# Patient Record
Sex: FEMALE | Race: WHITE | Employment: OTHER | URBAN - METROPOLITAN AREA
[De-identification: names, ages, dates, MRNs, and addresses within clinical notes are randomized per-mention and may not be internally consistent; named-entity substitution may affect disease eponyms.]

---

## 2020-08-24 ENCOUNTER — VIRTUAL VISIT (OUTPATIENT)
Dept: FAMILY MEDICINE CLINIC | Age: 80
End: 2020-08-24

## 2020-08-24 DIAGNOSIS — Z91.199 NO-SHOW FOR APPOINTMENT: Primary | ICD-10-CM

## 2021-05-06 ENCOUNTER — VIRTUAL VISIT (OUTPATIENT)
Dept: FAMILY MEDICINE CLINIC | Age: 81
End: 2021-05-06
Payer: MEDICARE

## 2021-05-06 DIAGNOSIS — D50.9 IRON DEFICIENCY ANEMIA, UNSPECIFIED IRON DEFICIENCY ANEMIA TYPE: ICD-10-CM

## 2021-05-06 DIAGNOSIS — M54.50 ACUTE LEFT-SIDED LOW BACK PAIN WITHOUT SCIATICA: ICD-10-CM

## 2021-05-06 DIAGNOSIS — E04.1 THYROID NODULE: ICD-10-CM

## 2021-05-06 DIAGNOSIS — R29.6 FALLS FREQUENTLY: ICD-10-CM

## 2021-05-06 DIAGNOSIS — F41.8 MIXED ANXIETY AND DEPRESSIVE DISORDER: ICD-10-CM

## 2021-05-06 DIAGNOSIS — F03.90 DEMENTIA WITHOUT BEHAVIORAL DISTURBANCE, UNSPECIFIED DEMENTIA TYPE: ICD-10-CM

## 2021-05-06 DIAGNOSIS — R27.0 ATAXIA: ICD-10-CM

## 2021-05-06 DIAGNOSIS — E11.9 DIABETES MELLITUS TYPE 2, DIET-CONTROLLED (HCC): Primary | ICD-10-CM

## 2021-05-06 DIAGNOSIS — R42 DIZZINESS: ICD-10-CM

## 2021-05-06 DIAGNOSIS — E21.1 SECONDARY HYPERPARATHYROIDISM, NON-RENAL (HCC): ICD-10-CM

## 2021-05-06 DIAGNOSIS — I10 ESSENTIAL HYPERTENSION: ICD-10-CM

## 2021-05-06 DIAGNOSIS — E78.2 MIXED HYPERLIPIDEMIA: ICD-10-CM

## 2021-05-06 DIAGNOSIS — Z13.31 POSITIVE DEPRESSION SCREENING: ICD-10-CM

## 2021-05-06 DIAGNOSIS — E87.6 HYPOKALEMIA: ICD-10-CM

## 2021-05-06 DIAGNOSIS — E55.9 VITAMIN D DEFICIENCY: ICD-10-CM

## 2021-05-06 PROBLEM — F41.9 ANXIETY: Status: ACTIVE | Noted: 2018-12-07

## 2021-05-06 PROBLEM — F43.21 GRIEF AT LOSS OF CHILD: Status: ACTIVE | Noted: 2018-12-07

## 2021-05-06 PROBLEM — R09.89 LABILE BLOOD PRESSURE: Status: ACTIVE | Noted: 2020-03-16

## 2021-05-06 PROBLEM — I45.10 RIGHT BUNDLE BRANCH BLOCK: Status: ACTIVE | Noted: 2017-10-11

## 2021-05-06 PROBLEM — Z63.4 GRIEF AT LOSS OF CHILD: Status: ACTIVE | Noted: 2018-12-07

## 2021-05-06 PROBLEM — C43.9 MALIGNANT MELANOMA OF SKIN (HCC): Status: ACTIVE | Noted: 2021-05-06

## 2021-05-06 PROBLEM — R01.1 MURMUR: Status: ACTIVE | Noted: 2020-03-16

## 2021-05-06 PROBLEM — M85.80 OSTEOPENIA: Status: ACTIVE | Noted: 2020-03-16

## 2021-05-06 PROBLEM — K57.30 DIVERTICULOSIS OF COLON: Status: ACTIVE | Noted: 2020-03-16

## 2021-05-06 PROBLEM — I69.30 SEQUELA OF CEREBROVASCULAR ACCIDENT: Status: ACTIVE | Noted: 2020-03-16

## 2021-05-06 PROCEDURE — 1090F PRES/ABSN URINE INCON ASSESS: CPT | Performed by: NURSE PRACTITIONER

## 2021-05-06 PROCEDURE — G8756 NO BP MEASURE DOC: HCPCS | Performed by: NURSE PRACTITIONER

## 2021-05-06 PROCEDURE — 1101F PT FALLS ASSESS-DOCD LE1/YR: CPT | Performed by: NURSE PRACTITIONER

## 2021-05-06 PROCEDURE — G9717 DOC PT DX DEP/BP F/U NT REQ: HCPCS | Performed by: NURSE PRACTITIONER

## 2021-05-06 PROCEDURE — G8427 DOCREV CUR MEDS BY ELIG CLIN: HCPCS | Performed by: NURSE PRACTITIONER

## 2021-05-06 PROCEDURE — G8400 PT W/DXA NO RESULTS DOC: HCPCS | Performed by: NURSE PRACTITIONER

## 2021-05-06 PROCEDURE — 99204 OFFICE O/P NEW MOD 45 MIN: CPT | Performed by: NURSE PRACTITIONER

## 2021-05-06 RX ORDER — IBUPROFEN 200 MG
400 TABLET ORAL
COMMUNITY
End: 2022-03-30

## 2021-05-06 NOTE — PROGRESS NOTES
For virtual visit patient would like to receive link via TEXT: [phone]    Jenny Iraheta is a 68 y.o. female (: 1940) evaluated via telephone on 2021 to address:    Chief Complaint   Patient presents with   [facility] Establish Care    Back Pain     x 1 month     Hypertension       Patient-Reported Vitals 2021   Patient-Reported Pulse 80   Patient-Reported Systolic  234   Patient-Reported Diastolic 561      Patient has not been seen in over a year so currently not taking any prescription medications. Allergies Reviewed.     Learning Assessment:     Learning Assessment 2021   PRIMARY LEARNER Patient   HIGHEST LEVEL OF EDUCATION - PRIMARY LEARNER  GRADUATED HIGH SCHOOL OR GED   BARRIERS PRIMARY LEARNER NONE   CO-LEARNER CAREGIVER No   CO-LEARNER NAME     PRIMARY LANGUAGE ENGLISH   LEARNER PREFERENCE PRIMARY VIDEOS   ANSWERED BY self   RELATIONSHIP SELF     Depression Screening:     3 most recent PHQ Screens 2021   PHQ Not Done Active Diagnosis of Depression or Bipolar Disorder   Little interest or pleasure in doing things Nearly every day   Feeling down, depressed, irritable, or hopeless Nearly every day   Total Score PHQ 2 6   Trouble falling or staying asleep, or sleeping too much Nearly every day   Feeling tired or having little energy Nearly every day   Poor appetite, weight loss, or overeating Not at all   Feeling bad about yourself - or that you are a failure or have let yourself or your family down Not at all   Trouble concentrating on things such as school, work, reading, or watching TV Not at all   Moving or speaking so slowly that other people could have noticed; or the opposite being so fidgety that others notice Not at all   Thoughts of being better off dead, or hurting yourself in some way Not at all   PHQ 9 Score 12   How difficult have these problems made it for you to do your work, take care of your home and get along with others Extremely difficult     Fall Risk Assessment: Fall Risk Assessment, last 12 mths 5/6/2021   Able to walk? Yes   Fall in past 12 months? 1   Do you feel unsteady? 1   Are you worried about falling 1   Is TUG test greater than 12 seconds? 0   Is the gait abnormal? 1   Number of falls in past 12 months 2   Fall with injury? 1     Abuse Screening:     Abuse Screening Questionnaire 5/6/2021   Do you ever feel afraid of your partner? N   Are you in a relationship with someone who physically or mentally threatens you? N   Is it safe for you to go home? Y     ADL Assessment:   No flowsheet data found. Coordination of Care Questionaire:   1. Have you been to the ER, urgent care clinic since your last visit? Hospitalized since your last visit? NO    2. Have you seen or consulted any other health care providers outside of the [address] [place] [address] since your last visit? Include any pap smears or colon screening. NO    Advanced Directive:   1. Do you have an Advanced Directive? NO    2. Would you like information on Advanced Directives?  NO

## 2021-05-06 NOTE — PROGRESS NOTES
[address] [place] [address]               [phone]      Raghu Reyna is a 68 y.o. female who was seen by synchronous (real-time) audio-video technology on 5/6/2021. Consent: Raghu Reyna, who was seen by synchronous (real-time) audio-video technology, and/or her healthcare decision maker, is aware that this patient-initiated, Telehealth encounter on 5/6/2021 is a billable service, with coverage as determined by her insurance carrier. She is aware that she may receive a bill and has provided verbal consent to proceed: Yes. Assessment & Plan:     Diagnoses and all orders for this visit:    1. Diabetes mellitus type 2, diet-controlled ([facility] [address]  -     HEMOGLOBIN A1C WITH EAG; Future  Have asked him/her to come in for follow-up labs currently not on any medications   2. Secondary hyperparathyroidism, non-renal (HCC)  -     PTH INTACT; Future  Have asked him/her to come in for follow-up labs  3. Essential hypertension  -     METABOLIC PANEL, COMPREHENSIVE; Future  Have asked him/her to come in for follow-up labs and Currently not on any medications, will have her come in tomorrow for blood pressure check  4. Mixed hyperlipidemia  -     LIPID PANEL; Future  Have asked him/her to come in for follow-up labs  5. Mixed anxiety and depressive disorder  Continues to have difficulty dealing with the death of her daughter  10. Thyroid nodule  -     TSH 3RD GENERATION; Future  -     T4, FREE; Future  Have asked him/her to come in for follow-up labs  7. Vitamin D deficiency  -     VITAMIN D, 25 HYDROXY; Future  Have asked him/her to come in for follow-up labs  8. Acute left-sided low back pain without sciatica  -     XR SPINE LUMB 2 OR 3 V; Future  -     XR SPINE THORACOLUMB JUNCTION MIN 2 V; Future  Will obtain x-rays to evaluate a potential cause for her pain  9.  Falls frequently  Per the patient and her daughter she has been falling frequently, will obtain x-rays for her back pain first and if this is negative we will move forward with physical therapy  10. Ataxia  -     VITAMIN B12; Future  Have asked him/her to come in for follow-up labs  11. Dizziness  -     VITAMIN B12; Future  Have asked him/her to come in for follow-up labs  12. Hypokalemia  -     MAGNESIUM; Future  Have asked him/her to come in for follow-up labs  13. Positive depression screening  -     TSH 3RD GENERATION; Future  -     T4, FREE; Future  Depression screen positive, PHQ 9 Score: 12, C-SSRS completed. 14. Dementia without behavioral disturbance, unspecified dementia type ([facility] [address]  -     VITAMIN B12; Future  Have asked him/her to come in for follow-up labs  15. Iron deficiency anemia, unspecified iron deficiency anemia type  -     CBC W/O DIFF; Future  -     IRON PROFILE; Future  -     FERRITIN; Future  Have asked him/her to come in for follow-up labs    Follow-up and Dispositions    · Return for tomorrow 1400 for labs and b/p check , AND 1-2 weeks, follow up labs and MAWV, VV, 30 min.              712  Subjective:     Health Maintenance Due   Topic Date Due    Hepatitis C Screening  Never done    Foot Exam Q1  Never done    MICROALBUMIN Q1  Never done    Eye Exam Retinal or Dilated  Never done    COVID-19 Vaccine (1) Never done    DTaP/Tdap/Td series (1 - Tdap) Never done    Shingrix Vaccine Age 50> (1 of 2) Never done    Bone Densitometry (Dexa) Screening  Never done    Medicare Yearly Exam  Never done             Eric Dawn is a 68 y.o. female who was seen for   Establish Care, Back Pain (x 1 month ), and Hypertension    Has not been on any medications for quite awhile because she has not had a doctor    Possible hypoglycemia  Pt will close her eyes and drops her head, wakes up with sugar  A1C in 2014 was 7.7    Back pain  Onset: 1 month ago  Location: left side, lower back, top of lumbar area  Characteristis: the pain was sudden and worsened each day, pain is better when lying flat sometimes, but hurts all the time, the pain is dull and then becomes sharp, causes her to lean to the side  Denies any trauma  PMH: osteopenia    Fall  Major fall 7/4/2020: ED visit  Loses balance frequently, can walk straight and then feel like she is tilting to the right  States she feels a little dizzy at that time  Has fallen 3-4 times this year    Dementia  They do not know anything about this diagnosis    Depression  States she has been depressed since she lost her daughter  Denies SI  Had no insomnia prior to back pain, endorses need to urinate frequently, has full bladder  She is not interested in medications      Prior to Admission medications    Medication Sig Start Date End Date Taking? Authorizing Provider   ibuprofen (MOTRIN) 200 mg tablet Take 400 mg by mouth every six (6) hours as needed for Pain. Yes Provider, Historical   lidocaine (LIDODERM) 5 % Apply patch to the affected area for 12 hours a day and remove for 12 hours a day. 5/7/21   Mere Gandhi NP   losartan (COZAAR) 50 mg tablet Take 1 Tab by mouth daily. 5/7/21   Mere Gandhi NP   metoprolol tartrate (LOPRESSOR) 100 mg IR tablet Take 1 Tab by mouth two (2) times a day.  3/16/20   Mariana Potter PA     Allergies   Allergen Reactions    Adhesive Rash     Clear tape    Heparin Other (comments)     Not tested for HIT, however heparin was associated with a drop in platelet count in 7034    Verapamil Shortness of Breath       Patient Active Problem List   Diagnosis Code    Murmur R01.1    Anxiety F41.9    Aortic valve stenosis I35.0    Arthritis of left knee M17.12    Carotid artery plaque I65.29    Carotid artery stenosis I65.29    Dementia ([facility] [address] F03.90    Diabetes mellitus type 2, diet-controlled ([address] D34.2    Diastolic dysfunction F59.30    Diverticulosis of colon K57.30    Essential hypertension I10    Grief at loss of child F45.21, Z63.4    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    Iron deficiency anemia, unspecified D50.9    Labile blood pressure R09.89    Sequela of cerebrovascular accident I69.30    Left atrial dilatation I51.7    LVF (left ventricular failure) (HCC) I50.1    Mitral valve annular calcification I05.9    Mixed anxiety and depressive disorder F41.8    Mixed hyperlipidemia E78.2    Moderate mitral regurgitation I34.0    Multiple thyroid nodules E04.2    Osteopenia M85.80    Personal history of malignant neoplasm of breast Z85.3    Right bundle branch block I45.10    Scoliosis, thoracogenic M41.30    Secondary hyperparathyroidism, non-renal (HCC) E21.1    Thyroid nodule E04.1    Vitamin D deficiency E55.9     Past Surgical History:   Procedure Laterality Date    HX BACK SURGERY      HX BREAST RECONSTRUCTION Left     HX  SECTION      HX CHOLECYSTECTOMY      HX CHOLECYSTECTOMY  2015    HX MASTECTOMY Left     HX OTHER SURGICAL  2015    Liver Biopsy      Family History   Problem Relation Age of Onset    Arthritis-osteo Mother     Diabetes Father     Heart Disease Father     Diabetes Sister     Diabetes Brother     Heart Attack Brother     Diabetes Paternal Grandmother      Social History     Tobacco Use    Smoking status: Never Smoker    Smokeless tobacco: Never Used   Substance Use Topics    Alcohol use: Never     Frequency: Never       Review of Systems   Constitutional: Negative. Respiratory: Positive for shortness of breath. With minimal exertion   Cardiovascular: Positive for chest pain and leg swelling. Gets chest pressure when she is upset  LE edema:  Has swollen knees   Musculoskeletal:        States swollen knees,    Neurological: Positive for dizziness. Frequent falls   Psychiatric/Behavioral: Positive for depression. Objective: There were no vitals taken for this visit.    General: alert, cooperative, no distress   Mental  status: normal mood, behavior, speech, dress, motor activity, and thought processes, able to follow commands   HENT: NCAT   Neck: no visualized mass   Resp: no respiratory distress   Neuro: no gross deficits   Skin: no discoloration or lesions of concern on visible areas   Psychiatric: normal affect, consistent with stated mood, no evidence of hallucinations     Additional exam findings: We discussed the expected course, resolution and complications of the diagnosis(es) in detail. Medication risks, benefits, costs, interactions, and alternatives were discussed as indicated. I advised her to contact the office if her condition worsens, changes or fails to improve as anticipated. She expressed understanding with the diagnosis(es) and plan. Tejas Sanchez is a 68 y.o. female who was evaluated by a video visit encounter for concerns as above. Patient identification was verified prior to start of the visit. A caregiver was present when appropriate. Due to this being a TeleHealth encounter ([facility]), evaluation of the following organ systems was limited: Vitals/Constitutional/EENT/Resp/CV/GI//MS/Neuro/Skin/Heme-Lymph-Imm. Pursuant to the emergency declaration under the [facility], [facility] waiver authority and the [employer] [place] and [employer] Act, this Virtual  Visit was conducted, with patient's (and/or legal guardian's) consent, to reduce the patient's risk of exposure to COVID-19 and provide necessary medical care. Services were provided through a video synchronous discussion virtually to substitute for in-person clinic visit. Patient and provider were located at their individual homes. An After Visit Summary was printed and given to the patient. All diagnosis have been discussed with the patient and all of the patient's questions have been answered. Follow-up and Dispositions    · Return for tomorrow 1400 for labs and b/p check , AND 1-2 weeks, follow up labs and MAWV, VV, 30 min.          Leticia Argueta Osiel Ames, [facility]  [employer]  [facility] [place].   Katia Cano

## 2021-05-07 ENCOUNTER — CLINICAL SUPPORT (OUTPATIENT)
Dept: FAMILY MEDICINE CLINIC | Age: 81
End: 2021-05-07

## 2021-05-07 VITALS
HEIGHT: 62 IN | SYSTOLIC BLOOD PRESSURE: 190 MMHG | OXYGEN SATURATION: 99 % | BODY MASS INDEX: 26.7 KG/M2 | DIASTOLIC BLOOD PRESSURE: 86 MMHG | RESPIRATION RATE: 13 BRPM | TEMPERATURE: 97.9 F | HEART RATE: 74 BPM

## 2021-05-07 DIAGNOSIS — M41.35 THORACOGENIC SCOLIOSIS OF THORACOLUMBAR REGION: ICD-10-CM

## 2021-05-07 DIAGNOSIS — I10 ESSENTIAL HYPERTENSION: ICD-10-CM

## 2021-05-07 DIAGNOSIS — Z01.30 BP CHECK: Primary | ICD-10-CM

## 2021-05-07 PROBLEM — C43.9 MALIGNANT MELANOMA OF SKIN (HCC): Status: RESOLVED | Noted: 2021-05-06 | Resolved: 2021-05-07

## 2021-05-07 RX ORDER — LIDOCAINE 50 MG/G
PATCH TOPICAL
Qty: 30 EACH | Refills: 2 | Status: SHIPPED | OUTPATIENT
Start: 2021-05-07

## 2021-05-07 RX ORDER — LOSARTAN POTASSIUM 50 MG/1
50 TABLET ORAL DAILY
Qty: 30 TAB | Refills: 1 | Status: SHIPPED | OUTPATIENT
Start: 2021-05-07 | End: 2021-08-10 | Stop reason: SDUPTHER

## 2021-05-07 NOTE — PROGRESS NOTES
Chief Complaint   Patient presents with    Labs    Blood Pressure Check     Patient is here for BP check; Ms. Rodriguez Emmanuel Bp reading is 190/86. Patient was ask to sit for 15 minutes. Bp will be retaken. Patient BP was retaken and it was 189/82.

## 2021-05-07 NOTE — PATIENT INSTRUCTIONS
Take tylenol arthritis strength two tablets three times a day for pain Use topical voltaren/diclofenac or aspercreme for pain relief Use lidocaine patch on back, on for 12 hours and off for 12 hours.

## 2021-06-03 ENCOUNTER — CLINICAL SUPPORT (OUTPATIENT)
Dept: FAMILY MEDICINE CLINIC | Age: 81
End: 2021-06-03

## 2021-06-03 VITALS
DIASTOLIC BLOOD PRESSURE: 80 MMHG | HEART RATE: 66 BPM | WEIGHT: 142.5 LBS | TEMPERATURE: 98.5 F | BODY MASS INDEX: 26.06 KG/M2 | SYSTOLIC BLOOD PRESSURE: 138 MMHG | OXYGEN SATURATION: 98 %

## 2021-06-03 DIAGNOSIS — I10 ESSENTIAL HYPERTENSION: Primary | ICD-10-CM

## 2021-06-03 RX ORDER — NITROGLYCERIN 0.4 MG/1
TABLET SUBLINGUAL
COMMUNITY
End: 2021-08-04 | Stop reason: SDUPTHER

## 2021-06-03 RX ORDER — NITROGLYCERIN 0.4 MG/1
TABLET SUBLINGUAL
Status: CANCELLED | OUTPATIENT
Start: 2021-06-03

## 2021-06-03 NOTE — PROGRESS NOTES
Chief Complaint   Patient presents with    Blood Pressure Check     1. Have you been to the ER, urgent care clinic since your last visit? Hospitalized since your last visit? No    2. Have you seen or consulted any other health care providers outside of the [address] [place] [address] since your last visit? Include any pap smears or colon screening.  No       Health Maintenance Due   Topic Date Due    Hepatitis C Screening  Never done    Foot Exam Q1  Never done    MICROALBUMIN Q1  Never done    Eye Exam Retinal or Dilated  Never done    COVID-19 Vaccine (1) Never done    DTaP/Tdap/Td series (1 - Tdap) Never done    Shingrix Vaccine Age 50> (1 of 2) Never done    Bone Densitometry (Dexa) Screening  Never done    Medicare Yearly Exam  Never done       Patients states she is have very bad back pain 10/10 when walking

## 2021-08-04 ENCOUNTER — OFFICE VISIT (OUTPATIENT)
Dept: FAMILY MEDICINE CLINIC | Age: 81
End: 2021-08-04
Payer: MEDICARE

## 2021-08-04 VITALS
DIASTOLIC BLOOD PRESSURE: 88 MMHG | OXYGEN SATURATION: 98 % | HEART RATE: 79 BPM | RESPIRATION RATE: 22 BRPM | SYSTOLIC BLOOD PRESSURE: 148 MMHG | WEIGHT: 139 LBS | HEIGHT: 62 IN | TEMPERATURE: 98.7 F | BODY MASS INDEX: 25.58 KG/M2

## 2021-08-04 DIAGNOSIS — R68.89 FORGETFULNESS: ICD-10-CM

## 2021-08-04 DIAGNOSIS — I50.1 LVF (LEFT VENTRICULAR FAILURE) (HCC): ICD-10-CM

## 2021-08-04 DIAGNOSIS — Z00.00 MEDICARE ANNUAL WELLNESS VISIT, SUBSEQUENT: Primary | ICD-10-CM

## 2021-08-04 DIAGNOSIS — I10 ESSENTIAL HYPERTENSION: ICD-10-CM

## 2021-08-04 DIAGNOSIS — R27.0 ATAXIA: ICD-10-CM

## 2021-08-04 DIAGNOSIS — R42 DIZZINESS: ICD-10-CM

## 2021-08-04 DIAGNOSIS — I51.89 DIASTOLIC DYSFUNCTION: ICD-10-CM

## 2021-08-04 DIAGNOSIS — E11.9 DIABETES MELLITUS TYPE 2, DIET-CONTROLLED (HCC): ICD-10-CM

## 2021-08-04 DIAGNOSIS — M23.91 LOCKING OF RIGHT KNEE: ICD-10-CM

## 2021-08-04 DIAGNOSIS — E78.2 MIXED HYPERLIPIDEMIA: ICD-10-CM

## 2021-08-04 DIAGNOSIS — M41.35 THORACOGENIC SCOLIOSIS OF THORACOLUMBAR REGION: ICD-10-CM

## 2021-08-04 PROBLEM — I20.9 ANGINA PECTORIS, UNSPECIFIED (HCC): Status: ACTIVE | Noted: 2021-08-04

## 2021-08-04 PROCEDURE — G0439 PPPS, SUBSEQ VISIT: HCPCS | Performed by: NURSE PRACTITIONER

## 2021-08-04 PROCEDURE — 1090F PRES/ABSN URINE INCON ASSESS: CPT | Performed by: NURSE PRACTITIONER

## 2021-08-04 PROCEDURE — G8419 CALC BMI OUT NRM PARAM NOF/U: HCPCS | Performed by: NURSE PRACTITIONER

## 2021-08-04 PROCEDURE — G9717 DOC PT DX DEP/BP F/U NT REQ: HCPCS | Performed by: NURSE PRACTITIONER

## 2021-08-04 PROCEDURE — G8427 DOCREV CUR MEDS BY ELIG CLIN: HCPCS | Performed by: NURSE PRACTITIONER

## 2021-08-04 PROCEDURE — 1101F PT FALLS ASSESS-DOCD LE1/YR: CPT | Performed by: NURSE PRACTITIONER

## 2021-08-04 PROCEDURE — G8400 PT W/DXA NO RESULTS DOC: HCPCS | Performed by: NURSE PRACTITIONER

## 2021-08-04 PROCEDURE — 99214 OFFICE O/P EST MOD 30 MIN: CPT | Performed by: NURSE PRACTITIONER

## 2021-08-04 PROCEDURE — G8753 SYS BP > OR = 140: HCPCS | Performed by: NURSE PRACTITIONER

## 2021-08-04 PROCEDURE — G8536 NO DOC ELDER MAL SCRN: HCPCS | Performed by: NURSE PRACTITIONER

## 2021-08-04 PROCEDURE — G8754 DIAS BP LESS 90: HCPCS | Performed by: NURSE PRACTITIONER

## 2021-08-04 RX ORDER — HYDROCHLOROTHIAZIDE 12.5 MG/1
12.5 TABLET ORAL DAILY
Qty: 30 TABLET | Refills: 0 | Status: SHIPPED | OUTPATIENT
Start: 2021-08-04 | End: 2021-08-12 | Stop reason: SDUPTHER

## 2021-08-04 RX ORDER — NITROGLYCERIN 0.4 MG/1
TABLET SUBLINGUAL
Qty: 1 BOTTLE | Refills: 0 | Status: SHIPPED | OUTPATIENT
Start: 2021-08-04

## 2021-08-04 NOTE — PROGRESS NOTES
[address] [place] [address]               [phone]      Glyn Gosselin is a 68 y.o. female and presents with Follow-up       Assessment/Plan:    Diagnoses and all orders for this visit:    1. Medicare annual wellness visit, subsequent  Completed today to include EtOH and depression screening, healthcare decision-makers validated and updated  2. Diabetes mellitus type 2, diet-controlled ([address]  Currently diet controlled, denies any signs and symptoms of hyperglycemia  Lab Results   Component Value Date/Time    Hemoglobin A1c 5.1 05/07/2021 12:00 AM     3. Essential hypertension  Endorses medication compliance, blood pressure elevated at today's visit, endorses taking NSAIDs on a daily basis, advised her to decrease her usage as this could be a cause of her elevated blood pressure  We will have her return in about a week for a blood pressure recheck  4. Mixed hyperlipidemia  Currently not taking any medications, lipid panel shows stable total and LDL cholesterol, no medications are needed  5. Thoracogenic scoliosis of thoracolumbar region  -     REFERRAL TO PHYSICAL THERAPY  Endorses frequent falls and running into things, referred to physical therapy for further evaluation  6. Ataxia  -     REFERRAL TO PHYSICAL THERAPY    7. Dizziness  -     REFERRAL TO PHYSICAL THERAPY    8. Diastolic dysfunction  -     nitroglycerin (NITROSTAT) 0.4 mg SL tablet; nitroglycerin 0.4 mg sublingual tablet  Place 1 tablet by sublingual route. Refill provided  9. Forgetfulness  -     REFERRAL TO NEUROPSYCHOLOGY  Endorses some forgetfulness, there is a diagnosis on her problem list of dementia however review of the records I am unable to find any supporting documentation for this diagnosis  Referral to neuropsychology for evaluation  10.  Locking of right knee  -     XR KNEE RT MIN 4 V; Future  -     REFERRAL TO ORTHOPEDICS  Endorses a problem of her right knee locking up, will obtain x-rays today and refer to orthopedics for further evaluation  11. LVF (left ventricular failure) ([facility]  Assessment & Plan:   well controlled, continue current medications        Follow-up and Dispositions    · Return in about 1 week (around 8/11/2021) for NV, b/pcheck, AND, 3month, DM, HLD, HTN, 30 min, office only. Subjective:    Hypertension: has been taking ibuprofen 400mg every morning and sometimes 200mg at night, takes this every day   Patient reports taking medications as instructed. yes   Medication side effects noted. no  Headache upon wakening. no   Home BP monitoring in range of does not check's systolic. Do you experience chest pain/pressure or SOB with exertion? no  Maintain a low salt diet? yes  Key CAD CHF Meds             nitroglycerin (NITROSTAT) 0.4 mg SL tablet (Taking) nitroglycerin 0.4 mg sublingual tablet  Place 1 tablet by sublingual route. hydroCHLOROthiazide (HYDRODIURIL) 12.5 mg tablet (Taking/Discontinued) Take 1 Tablet by mouth daily. losartan (COZAAR) 50 mg tablet (Taking/Discontinued) Take 1 Tab by mouth daily. Ataxia  Falling and stumbling  States she was walking and her left knee locked up  This causes pain  This has been long standing  She will sit down until it is better, will call her  for help    ROS:     ROS  As stated in HPI, otherwise all others negative. The problem list was updated as a part of today's visit.   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    Iron deficiency anemia, unspecified D50.9    Labile blood pressure R09.89    Sequela of cerebrovascular accident I69.30    Left atrial dilatation I51.7    LVF (left ventricular failure) (HCC) I50.1    Mitral valve annular calcification I05.9    Mixed anxiety and depressive disorder F41.8    Mixed hyperlipidemia E78.2    Moderate mitral regurgitation I34.0    Multiple thyroid nodules E04.2    Osteopenia M85.80    Personal history of malignant neoplasm of breast Z85.3    Right bundle branch block I45.10    Scoliosis, thoracogenic M41.30    Secondary hyperparathyroidism, non-renal (HCC) E21.1    Thyroid nodule E04.1    Vitamin D deficiency E55.9    Angina pectoris, unspecified I20.9       The PSH, FH were reviewed. SH:  Social History     Tobacco Use    Smoking status: Never Smoker    Smokeless tobacco: Never Used   Substance Use Topics    Alcohol use: Never    Drug use: Never       Medications/Allergies:  Current Outpatient Medications on File Prior to Visit   Medication Sig Dispense Refill    lidocaine (LIDODERM) 5 % Apply patch to the affected area for 12 hours a day and remove for 12 hours a day. 30 Each 2    ibuprofen (MOTRIN) 200 mg tablet Take 400 mg by mouth every six (6) hours as needed for Pain. No current facility-administered medications on file prior to visit. Allergies   Allergen Reactions    Adhesive Rash     Clear tape    Heparin Other (comments)     Not tested for HIT, however heparin was associated with a drop in platelet count in 4866    Verapamil Shortness of Breath       Objective:  Visit Vitals  BP (!) 148/88 (BP 1 Location: Right arm, BP Patient Position: Sitting, BP Cuff Size: Adult)   Pulse 79   Temp 98.7 °F (37.1 °C) (Temporal)   Resp 22   Ht 5' 2\" (1.575 m)   Wt 139 lb (63 kg)   SpO2 98%   BMI 25.42 kg/m²    Body mass index is 25.42 kg/m².     Physical assessment  Physical Exam      Labwork and Ancillary Studies:    CBC w/Diff  Lab Results   Component Value Date/Time    WBC 6.1 05/07/2021 12:00 AM    HGB 12.5 05/07/2021 12:00 AM    PLATELET 896 61/66/7059 12:00 AM         Basic Metabolic Profile  Lab Results   Component Value Date/Time    Sodium 144 05/07/2021 12:00 AM    Potassium 4.1 05/07/2021 12:00 AM    Chloride 107 (H) 05/07/2021 12:00 AM    CO2 24 05/07/2021 12:00 AM    Anion gap 9 03/16/2020 02:15 PM    Glucose 104 (H) 05/07/2021 12:00 AM    BUN 9 05/07/2021 12:00 AM    Creatinine 0.73 05/07/2021 12:00 AM    BUN/Creatinine ratio 12 05/07/2021 12:00 AM    GFR est AA 91 05/07/2021 12:00 AM    GFR est non-AA 79 05/07/2021 12:00 AM    Calcium 9.7 05/07/2021 12:00 AM        Cholesterol  Lab Results   Component Value Date/Time    Cholesterol, total 192 05/07/2021 12:00 AM    HDL Cholesterol 61 05/07/2021 12:00 AM    LDL, calculated 104 (H) 05/07/2021 12:00 AM    Triglyceride 159 (H) 05/07/2021 12:00 AM       Health Maintenance:   Health Maintenance   Topic Date Due    Hepatitis C Screening  Never done    Foot Exam Q1  Never done    MICROALBUMIN Q1  Never done    Eye Exam Retinal or Dilated  Never done    COVID-19 Vaccine (1) Never done    DTaP/Tdap/Td series (1 - Tdap) Never done    Shingrix Vaccine Age 50> (1 of 2) Never done    Bone Densitometry (Dexa) Screening  Never done    Flu Vaccine (1) 09/01/2021    Medicare Yearly Exam  08/05/2022    Pneumococcal 65+ years  Completed       I have discussed the diagnosis with the patient and the intended plan as seen in the above orders. The patient has received an After-Visit Summary and questions were answered concerning future plans. An After Visit Summary was printed and given to the patient. All diagnosis have been discussed with the patient and all of the patient's questions have been answered. Follow-up and Dispositions    · Return in about 1 week (around 8/11/2021) for NV, b/pcheck, AND, 3month, DM, HLD, HTN, 30 min, office only.            Marily Douglass, [facility]  [facility]   [address] [place] [address]. [phone]      This is the Subsequent Medicare Annual Wellness Exam, performed 12 months or more after the Initial AWV or the last Subsequent AWV    I have reviewed the patient's medical history in detail and updated the computerized patient record. Assessment/Plan   Education and counseling provided:  Are appropriate based on today's review and evaluation    1. Medicare annual wellness visit, subsequent  2. Diabetes mellitus type 2, diet-controlled ([facility]  3. Essential hypertension  4. Mixed hyperlipidemia  5. Thoracogenic scoliosis of thoracolumbar region  -     REFERRAL TO PHYSICAL THERAPY  6. Ataxia  -     REFERRAL TO PHYSICAL THERAPY  7. Dizziness  -     REFERRAL TO PHYSICAL THERAPY  8. Diastolic dysfunction  -     nitroglycerin (NITROSTAT) 0.4 mg SL tablet; nitroglycerin 0.4 mg sublingual tablet  Place 1 tablet by sublingual route., Normal, Disp-1 Bottle, R-0  9. Forgetfulness  -     REFERRAL TO NEUROPSYCHOLOGY  10. Locking of right knee  -     XR KNEE RT MIN 4 V; Future  -     REFERRAL TO ORTHOPEDICS  11.  [facility]) [facility]  Assessment & Plan:   well controlled, continue current medications       Depression Risk Factor Screening     3 most recent PHQ Screens 8/12/2021   PHQ Not Done -   Little interest or pleasure in doing things Not at all   Feeling down, depressed, irritable, or hopeless Not at all   Total Score PHQ 2 0   Trouble falling or staying asleep, or sleeping too much -   Feeling tired or having little energy -   Poor appetite, weight loss, or overeating -   Feeling bad about yourself - or that you are a failure or have let yourself or your family down -   Trouble concentrating on things such as school, work, reading, or watching TV -   Moving or speaking so slowly that other people could have noticed; or the opposite being so fidgety that others notice -   Thoughts of being better off dead, or hurting yourself in some way -   PHQ 9 Score - How difficult have these problems made it for you to do your work, take care of your home and get along with others -       Alcohol Risk Screen    Do you average more than 1 drink per night or more than 7 drinks a week:  No    On any one occasion in the past three months have you have had more than 3 drinks containing alcohol:  No        Functional Ability and Level of Safety    Hearing: Hearing is good. Activities of Daily Living: The home contains: handrails and grab bars  Patient needs help with:  transportation and shopping      Ambulation: with mild difficulty     Fall Risk:  Fall Risk Assessment, last 12 mths 8/4/2021   Able to walk? Yes   Fall in past 12 months? 1   Do you feel unsteady? 1   Are you worried about falling 1   Is TUG test greater than 12 seconds? 0   Is the gait abnormal? 0   Number of falls in past 12 months 2   Fall with injury?  0      Abuse Screen:  Patient is not abused       Cognitive Screening    Has your family/caregiver stated any concerns about your memory: no     Cognitive Screening: Normal - Clock Drawing Test    Health Maintenance Due     Health Maintenance Due   Topic Date Due    Hepatitis C Screening  Never done    Foot Exam Q1  Never done    MICROALBUMIN Q1  Never done    Eye Exam Retinal or Dilated  Never done    COVID-19 Vaccine (1) Never done    DTaP/Tdap/Td series (1 - Tdap) Never done    Shingrix Vaccine Age 50> (1 of 2) Never done    Bone Densitometry (Dexa) Screening  Never done       Patient Care Team   Patient Care Team:  Rodney Macias NP as PCP - General (Nurse Practitioner)  Rodney Macias NP as PCP - Annia Garcia Provider    History     Patient Active Problem List   Diagnosis Code    Murmur R01.1    Anxiety F41.9    Aortic valve stenosis I35.0    Arthritis of left knee M17.12    Carotid artery plaque I65.29    Carotid artery stenosis I65.29    Dementia ([facility] [facility] F03.90    Diabetes mellitus type 2, diet-controlled ([facility] E11.9    Diastolic dysfunction I92.97    Diverticulosis of colon K57.30    Essential hypertension I10    Grief at loss of child F45.21, Z63.4    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    Iron deficiency anemia, unspecified D50.9    Labile blood pressure R09.89    Sequela of cerebrovascular accident I69.30    Left atrial dilatation I51.7    LVF (left ventricular failure) (HCC) I50.1    Mitral valve annular calcification I05.9    Mixed anxiety and depressive disorder F41.8    Mixed hyperlipidemia E78.2    Moderate mitral regurgitation I34.0    Multiple thyroid nodules E04.2    Osteopenia M85.80    Personal history of malignant neoplasm of breast Z85.3    Right bundle branch block I45.10    Scoliosis, thoracogenic M41.30    Secondary hyperparathyroidism, non-renal (HCC) E21.1    Thyroid nodule E04.1    Vitamin D deficiency E55.9    Angina pectoris, unspecified I20.9     Past Medical History:   Diagnosis Date    Cancer ([facility]     breast    Cataract     Diabetes ([facility]     Heart murmur     Hyperlipidemia     Hypertension     Melanoma ([facility] 2010    TIA (transient ischemic attack)       Past Surgical History:   Procedure Laterality Date    HX BACK SURGERY      HX BREAST RECONSTRUCTION Left     HX  SECTION      HX CHOLECYSTECTOMY      HX CHOLECYSTECTOMY  2015    HX MASTECTOMY Left     HX OTHER SURGICAL  2015    Liver Biopsy      Current Outpatient Medications   Medication Sig Dispense Refill    nitroglycerin (NITROSTAT) 0.4 mg SL tablet nitroglycerin 0.4 mg sublingual tablet  Place 1 tablet by sublingual route. 1 Bottle 0    lidocaine (LIDODERM) 5 % Apply patch to the affected area for 12 hours a day and remove for 12 hours a day. 30 Each 2    ibuprofen (MOTRIN) 200 mg tablet Take 400 mg by mouth every six (6) hours as needed for Pain.  hydroCHLOROthiazide (HYDRODIURIL) 12.5 mg tablet Take 1 Tablet by mouth daily.  90 Tablet 1    losartan (COZAAR) 50 mg tablet Take 1 Tablet by mouth daily.  90 Tablet 1     Allergies   Allergen Reactions    Adhesive Rash     Clear tape    Heparin Other (comments)     Not tested for HIT, however heparin was associated with a drop in platelet count in 9813    Verapamil Shortness of Breath       Family History   Problem Relation Age of Onset    Arthritis-osteo Mother     Diabetes Father     Heart Disease Father     Diabetes Sister     Diabetes Brother     Heart Attack Brother     Diabetes Paternal Grandmother      Social History     Tobacco Use    Smoking status: Never Smoker    Smokeless tobacco: Never Used   Substance Use Topics    Alcohol use: Never         Rosamaria Zapata NP

## 2021-08-04 NOTE — PROGRESS NOTES
Exam Room 7    Patient blood pressure is 202/96 patient is having a hard time breathing and keeping steady, when reaching for he cup patient hands where shaking. Patient was asked if she could breathe better without her mask patient said yes, gave patient a cup of water , patient was told to take deep breaths. Patient states sometimes I dont remember falling\"    Patient states \" I have this dizzy feeling, I never know when it is going to happen it just happens. Sometimes I just stand up straight or sit down until it goes away. Paient was asked how many times a day the dizzy spells happen patient replied  maybe 2 times a day\"  Patent would like to talk about the test that was done on her back  Patient states \" I have bad knee's, both are bad and the right knee makes me fall a lot\"    1. Have you been to the ER, urgent care clinic since your last visit? Hospitalized since your last visit? No    2. Have you seen or consulted any other health care providers outside of the [address] [place] [address] since your last visit? Include any pap smears or colon screening.  No    Health Maintenance Due   Topic Date Due    Hepatitis C Screening  Never done    Foot Exam Q1  Never done    MICROALBUMIN Q1  Never done    Eye Exam Retinal or Dilated  Never done    COVID-19 Vaccine (1) Never done    DTaP/Tdap/Td series (1 - Tdap) Never done    Shingrix Vaccine Age 50> (1 of 2) Never done    Bone Densitometry (Dexa) Screening  Never done    Medicare Yearly Exam  Never done

## 2021-08-04 NOTE — PATIENT INSTRUCTIONS
Medicare Wellness Visit, Female     The best way to live healthy is to have a lifestyle where you eat a well-balanced diet, exercise regularly, limit alcohol use, and quit all forms of tobacco/nicotine, if applicable. Regular preventive services are another way to keep healthy. Preventive services (vaccines, screening tests, monitoring & exams) can help personalize your care plan, which helps you manage your own care. Screening tests can find health problems at the earliest stages, when they are easiest to treat. So follows the current, evidence-based guidelines published by the [facility] Saeed ([facility]) when recommending preventive services for our patients. Because we follow these guidelines, sometimes recommendations change over time as research supports it. (For example, mammograms used to be recommended annually. Even though Medicare will still pay for an annual mammogram, the newer guidelines recommend a mammogram every two years for women of average risk). Of course, you and your doctor may decide to screen more often for some diseases, based on your risk and your co-morbidities (chronic disease you are already diagnosed with). Preventive services for you include:  - Medicare offers their members a free annual wellness visit, which is time for you and your primary care provider to discuss and plan for your preventive service needs. Take advantage of this benefit every year!  -All adults over the age of 72 should receive the recommended pneumonia vaccines. Current USPSTF guidelines recommend a series of two vaccines for the best pneumonia protection.   -All adults should have a flu vaccine yearly and a tetanus vaccine every 10 years.   -All adults age 48 and older should receive the shingles vaccines (series of two vaccines).       -All adults age 38-68 who are overweight should have a diabetes screening test once every three years.   -All adults born between [birth year] and 1963 should be screened once for Hepatitis C.  -Other screening tests and preventive services for persons with diabetes include: an eye exam to screen for diabetic retinopathy, a kidney function test, a foot exam, and stricter control over your cholesterol.   -Cardiovascular screening for adults with routine risk involves an electrocardiogram (ECG) at intervals determined by your doctor.   -Colorectal cancer screenings should be done for adults age 54-65 with no increased risk factors for colorectal cancer. There are a number of acceptable methods of screening for this type of cancer. Each test has its own benefits and drawbacks. Discuss with your doctor what is most appropriate for you during your annual wellness visit. The different tests include: colonoscopy (considered the best screening method), a fecal occult blood test, a fecal DNA test, and sigmoidoscopy.    -A bone mass density test is recommended when a woman turns 65 to screen for osteoporosis. This test is only recommended one time, as a screening. Some providers will use this same test as a disease monitoring tool if you already have osteoporosis. -Breast cancer screenings are recommended every other year for women of normal risk, age 54-69.  -Cervical cancer screenings for women over age 72 are only recommended with certain risk factors.      Here is a list of your current Health Maintenance items (your personalized list of preventive services) with a due date:  Health Maintenance Due   Topic Date Due    Hepatitis C Test  Never done    Diabetic Foot Care  Never done    Albumin Urine Test  Never done    Eye Exam  Never done    COVID-19 Vaccine (1) Never done    DTaP/Tdap/Td  (1 - Tdap) Never done    Shingles Vaccine (1 of 2) Never done    Bone Mineral Density   Never done

## 2021-08-10 DIAGNOSIS — I10 ESSENTIAL HYPERTENSION: ICD-10-CM

## 2021-08-10 RX ORDER — LOSARTAN POTASSIUM 50 MG/1
50 TABLET ORAL DAILY
Qty: 30 TABLET | Refills: 1 | Status: SHIPPED | OUTPATIENT
Start: 2021-08-10 | End: 2021-08-12 | Stop reason: SDUPTHER

## 2021-08-12 ENCOUNTER — OFFICE VISIT (OUTPATIENT)
Dept: FAMILY MEDICINE CLINIC | Age: 81
End: 2021-08-12
Payer: MEDICARE

## 2021-08-12 VITALS
OXYGEN SATURATION: 99 % | TEMPERATURE: 98.4 F | HEART RATE: 69 BPM | HEIGHT: 62 IN | DIASTOLIC BLOOD PRESSURE: 78 MMHG | RESPIRATION RATE: 22 BRPM | BODY MASS INDEX: 25.42 KG/M2 | SYSTOLIC BLOOD PRESSURE: 134 MMHG

## 2021-08-12 DIAGNOSIS — I10 ESSENTIAL HYPERTENSION: Primary | ICD-10-CM

## 2021-08-12 DIAGNOSIS — F03.90 DEMENTIA WITHOUT BEHAVIORAL DISTURBANCE, UNSPECIFIED DEMENTIA TYPE: ICD-10-CM

## 2021-08-12 DIAGNOSIS — I10 ESSENTIAL HYPERTENSION: ICD-10-CM

## 2021-08-12 PROCEDURE — G8400 PT W/DXA NO RESULTS DOC: HCPCS | Performed by: NURSE PRACTITIONER

## 2021-08-12 PROCEDURE — G8419 CALC BMI OUT NRM PARAM NOF/U: HCPCS | Performed by: NURSE PRACTITIONER

## 2021-08-12 PROCEDURE — G8427 DOCREV CUR MEDS BY ELIG CLIN: HCPCS | Performed by: NURSE PRACTITIONER

## 2021-08-12 PROCEDURE — G8754 DIAS BP LESS 90: HCPCS | Performed by: NURSE PRACTITIONER

## 2021-08-12 PROCEDURE — 1101F PT FALLS ASSESS-DOCD LE1/YR: CPT | Performed by: NURSE PRACTITIONER

## 2021-08-12 PROCEDURE — 1090F PRES/ABSN URINE INCON ASSESS: CPT | Performed by: NURSE PRACTITIONER

## 2021-08-12 PROCEDURE — G9717 DOC PT DX DEP/BP F/U NT REQ: HCPCS | Performed by: NURSE PRACTITIONER

## 2021-08-12 PROCEDURE — 99213 OFFICE O/P EST LOW 20 MIN: CPT | Performed by: NURSE PRACTITIONER

## 2021-08-12 PROCEDURE — G8753 SYS BP > OR = 140: HCPCS | Performed by: NURSE PRACTITIONER

## 2021-08-12 PROCEDURE — G8536 NO DOC ELDER MAL SCRN: HCPCS | Performed by: NURSE PRACTITIONER

## 2021-08-12 RX ORDER — HYDROCHLOROTHIAZIDE 12.5 MG/1
12.5 TABLET ORAL DAILY
Qty: 90 TABLET | Refills: 1 | Status: SHIPPED | OUTPATIENT
Start: 2021-08-12

## 2021-08-12 RX ORDER — LOSARTAN POTASSIUM 50 MG/1
50 TABLET ORAL DAILY
Qty: 30 TABLET | Refills: 1 | Status: CANCELLED | OUTPATIENT
Start: 2021-08-12

## 2021-08-12 RX ORDER — LOSARTAN POTASSIUM 50 MG/1
50 TABLET ORAL DAILY
Qty: 90 TABLET | Refills: 1 | Status: SHIPPED | OUTPATIENT
Start: 2021-08-12 | End: 2021-08-17 | Stop reason: SDUPTHER

## 2021-08-12 NOTE — PROGRESS NOTES
[address] [place] [address]               [phone]      Elyce Frankel is a 68 y.o. female and presents with Follow Up Chronic Condition, Diabetes, Cholesterol Problem, and Hypertension       Assessment/Plan:    Diagnoses and all orders for this visit:    1. Essential hypertension  -     hydroCHLOROthiazide (HYDRODIURIL) 12.5 mg tablet; Take 1 Tablet by mouth daily. -     losartan (COZAAR) 50 mg tablet; Take 1 Tablet by mouth daily. Patient brought her blood pressure cuff from home with her today, I had her demonstrate usage, I instructed her on the proper usage of her wrist cuff, she verbalized understanding  Blood pressures are stable we will continue same medications  Refills provided    2. Dementia without behavioral disturbance, unspecified dementia type ([facility] [address]  She unfortunately went to the wrong building this morning prior to her visit and felt very lost, when she got to my office she was very upset mildly tachypneic and tearful. I gave her emotional support and informed her is not a problem I would have seen her no matter what. This did help her to calm down. Called the daughter, with patients permission, and advised her I recommend she continue to move forward with testing with Dr. Jomar Toussaint. Follow-up and Dispositions    · Return if symptoms worsen or fail to improve. Subjective:    Visit today was supposed to be for a blood pressure check however, she came into the front office very upset and hyperventilating. Patient states she was in the wrong building and kept walking around looking for the office, she knew everything looked different but could not figure out what was going on. Finally someone at the other building was able to direct her over to my office. While speaking with her she was mildly tachypnea  and tearful. Review of the chart shows she refused the PT services ordered for her on 8/4/21 for ataxia and dizziness.  Patient states she was uncertain if her insurance coverd the services and her  stated she did not need the services,   There is no current appointment with neuropsychology  There are no notations as of yet on the orthopedic referral for her right knee locking. With the patients permission I called and spoke with her daughter Jagdish Tobias about today's events. Jagdish Tobias states the memory problems started when she started having mini strokes. Hypertension: validated usage of home b/p machine   Patient reports taking medications as instructed. yes   Medication side effects noted. no  Headache upon wakening. no   Home BP monitoring in range of last night 136/90, prior are: 136/85,115/99, 159/87, 136/84, 122/77, 134/75, 118/69. Reports decreased dizziness. Do you experience chest pain/pressure or SOB with exertion? no  Maintain a low salt diet? yes  Key CAD CHF Meds             hydroCHLOROthiazide (HYDRODIURIL) 12.5 mg tablet (Taking) Take 1 Tablet by mouth daily. losartan (COZAAR) 50 mg tablet (Taking) Take 1 Tablet by mouth daily. nitroglycerin (NITROSTAT) 0.4 mg SL tablet (Taking) nitroglycerin 0.4 mg sublingual tablet  Place 1 tablet by sublingual route. ROS:     ROS    The problem list was updated as a part of today's visit.   Patient Active Problem List   Diagnosis Code    Murmur R01.1    Anxiety F41.9    Aortic valve stenosis I35.0    Arthritis of left knee M17.12    Carotid artery plaque I65.29    Carotid artery stenosis I65.29    Dementia ([facility] [address] F03.90    Diabetes mellitus type 2, diet-controlled ([facility] [address] H69.2    Diastolic dysfunction N43.28    Diverticulosis of colon K57.30    Essential hypertension I10    Grief at loss of child F45.21, Z63.4    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    Iron deficiency anemia, unspecified D50.9    Labile blood pressure R09.89    Sequela of cerebrovascular accident I69.30    Left atrial dilatation I51.7    LVF (left ventricular failure) (HCC) I50.1    Mitral valve annular calcification I05.9    Mixed anxiety and depressive disorder F41.8    Mixed hyperlipidemia E78.2    Moderate mitral regurgitation I34.0    Multiple thyroid nodules E04.2    Osteopenia M85.80    Personal history of malignant neoplasm of breast Z85.3    Right bundle branch block I45.10    Scoliosis, thoracogenic M41.30    Secondary hyperparathyroidism, non-renal (HCC) E21.1    Thyroid nodule E04.1    Vitamin D deficiency E55.9    Angina pectoris, unspecified I20.9       The PSH, FH were reviewed. SH:  Social History     Tobacco Use    Smoking status: Never Smoker    Smokeless tobacco: Never Used   Substance Use Topics    Alcohol use: Never    Drug use: Never       Medications/Allergies:  Current Outpatient Medications on File Prior to Visit   Medication Sig Dispense Refill    nitroglycerin (NITROSTAT) 0.4 mg SL tablet nitroglycerin 0.4 mg sublingual tablet  Place 1 tablet by sublingual route. 1 Bottle 0    lidocaine (LIDODERM) 5 % Apply patch to the affected area for 12 hours a day and remove for 12 hours a day. 30 Each 2    ibuprofen (MOTRIN) 200 mg tablet Take 400 mg by mouth every six (6) hours as needed for Pain.  [DISCONTINUED] losartan (COZAAR) 50 mg tablet Take 1 Tablet by mouth daily. 30 Tablet 1    [DISCONTINUED] hydroCHLOROthiazide (HYDRODIURIL) 12.5 mg tablet Take 1 Tablet by mouth daily. 30 Tablet 0     No current facility-administered medications on file prior to visit.         Allergies   Allergen Reactions    Adhesive Rash     Clear tape    Heparin Other (comments)     Not tested for HIT, however heparin was associated with a drop in platelet count in 2897    Verapamil Shortness of Breath       Objective:  Visit Vitals  BP (!) 147/66 (BP 1 Location: Right arm, BP Patient Position: Sitting, BP Cuff Size: Small adult)   Pulse 69   Temp 98.4 °F (36.9 °C) (Temporal)   Resp 22   Ht 5' 2\" (1.575 m)   SpO2 99%   BMI 25.42 kg/m²    Body mass index is 25.42 kg/m². Physical assessment  Physical Exam      Labwork and Ancillary Studies:    CBC w/Diff  Lab Results   Component Value Date/Time    WBC 6.1 05/07/2021 12:00 AM    HGB 12.5 05/07/2021 12:00 AM    PLATELET 957 39/61/3666 12:00 AM         Basic Metabolic Profile  Lab Results   Component Value Date/Time    Sodium 144 05/07/2021 12:00 AM    Potassium 4.1 05/07/2021 12:00 AM    Chloride 107 (H) 05/07/2021 12:00 AM    CO2 24 05/07/2021 12:00 AM    Anion gap 9 03/16/2020 02:15 PM    Glucose 104 (H) 05/07/2021 12:00 AM    BUN 9 05/07/2021 12:00 AM    Creatinine 0.73 05/07/2021 12:00 AM    BUN/Creatinine ratio 12 05/07/2021 12:00 AM    GFR est AA 91 05/07/2021 12:00 AM    GFR est non-AA 79 05/07/2021 12:00 AM    Calcium 9.7 05/07/2021 12:00 AM        Cholesterol  Lab Results   Component Value Date/Time    Cholesterol, total 192 05/07/2021 12:00 AM    HDL Cholesterol 61 05/07/2021 12:00 AM    LDL, calculated 104 (H) 05/07/2021 12:00 AM    Triglyceride 159 (H) 05/07/2021 12:00 AM       Health Maintenance:   Health Maintenance   Topic Date Due    Hepatitis C Screening  Never done    Foot Exam Q1  Never done    MICROALBUMIN Q1  Never done    Eye Exam Retinal or Dilated  Never done    COVID-19 Vaccine (1) Never done    DTaP/Tdap/Td series (1 - Tdap) Never done    Shingrix Vaccine Age 50> (1 of 2) Never done    Bone Densitometry (Dexa) Screening  Never done    Flu Vaccine (1) 09/01/2021    Medicare Yearly Exam  08/05/2022    Pneumococcal 65+ years  Completed       I have discussed the diagnosis with the patient and the intended plan as seen in the above orders. The patient has received an After-Visit Summary and questions were answered concerning future plans. An After Visit Summary was printed and given to the patient. All diagnosis have been discussed with the patient and all of the patient's questions have been answered. Follow-up and Dispositions    · Return if symptoms worsen or fail to improve. Rajesh Gums, [facility]  [address] [facility]   [address] [facility] [address]  [address]

## 2021-08-12 NOTE — PROGRESS NOTES
Exam Room 8    Patient was seen visibly shaky, overwhelmed, and unsteady when standing on her on. Patient is having shortness of breath. Patient states \" I am having knee pain and I think I am having a  Hard time of hearing\"     1. Have you been to the ER, urgent care clinic since your last visit? Hospitalized since your last visit? No    2. Have you seen or consulted any other health care providers outside of the [address] [place] [address] since your last visit? Include any pap smears or colon screening.  No    Health Maintenance Due   Topic Date Due    Hepatitis C Screening  Never done    Foot Exam Q1  Never done    MICROALBUMIN Q1  Never done    Eye Exam Retinal or Dilated  Never done    COVID-19 Vaccine (1) Never done    DTaP/Tdap/Td series (1 - Tdap) Never done    Shingrix Vaccine Age 50> (1 of 2) Never done    Bone Densitometry (Dexa) Screening  Never done

## 2021-08-17 DIAGNOSIS — I10 ESSENTIAL HYPERTENSION: ICD-10-CM

## 2021-08-17 RX ORDER — LOSARTAN POTASSIUM 50 MG/1
50 TABLET ORAL DAILY
Qty: 90 TABLET | Refills: 1 | Status: SHIPPED | OUTPATIENT
Start: 2021-08-17

## 2021-08-20 DIAGNOSIS — M54.50 ACUTE LEFT-SIDED LOW BACK PAIN WITHOUT SCIATICA: ICD-10-CM

## 2022-03-18 PROBLEM — F41.9 ANXIETY: Status: ACTIVE | Noted: 2018-12-07

## 2022-03-18 PROBLEM — E04.1 THYROID NODULE: Status: ACTIVE | Noted: 2020-03-16

## 2022-03-18 PROBLEM — R09.89 LABILE BLOOD PRESSURE: Status: ACTIVE | Noted: 2020-03-16

## 2022-03-19 PROBLEM — I20.9 ANGINA PECTORIS, UNSPECIFIED (HCC): Status: ACTIVE | Noted: 2021-08-04

## 2022-03-19 PROBLEM — I69.30 SEQUELA OF CEREBROVASCULAR ACCIDENT: Status: ACTIVE | Noted: 2020-03-16

## 2022-03-19 PROBLEM — R01.1 MURMUR: Status: ACTIVE | Noted: 2020-03-16

## 2022-03-19 PROBLEM — F43.21 GRIEF AT LOSS OF CHILD: Status: ACTIVE | Noted: 2018-12-07

## 2022-03-19 PROBLEM — Z63.4 GRIEF AT LOSS OF CHILD: Status: ACTIVE | Noted: 2018-12-07

## 2022-03-19 PROBLEM — F41.8 MIXED ANXIETY AND DEPRESSIVE DISORDER: Status: ACTIVE | Noted: 2020-03-16

## 2022-03-20 PROBLEM — I45.10 RIGHT BUNDLE BRANCH BLOCK: Status: ACTIVE | Noted: 2017-10-11

## 2022-03-20 PROBLEM — M85.80 OSTEOPENIA: Status: ACTIVE | Noted: 2020-03-16

## 2022-03-20 PROBLEM — K57.30 DIVERTICULOSIS OF COLON: Status: ACTIVE | Noted: 2020-03-16

## 2022-03-27 PROBLEM — R06.02 SHORTNESS OF BREATH: Status: ACTIVE | Noted: 2022-03-27

## 2022-03-27 PROBLEM — R07.9 CHEST PAIN: Status: ACTIVE | Noted: 2022-03-27

## 2022-03-27 PROBLEM — I35.0 AORTIC STENOSIS: Status: ACTIVE | Noted: 2022-03-27

## 2022-03-31 ENCOUNTER — PATIENT OUTREACH (OUTPATIENT)
Dept: CASE MANAGEMENT | Age: 82
End: 2022-03-31

## 2022-03-31 NOTE — PROGRESS NOTES
Care Transitions Initial Call    Call within 2 business days of discharge: Yes     Patient: Ritika Miller Patient : 1940 MRN: [id number]    Last Discharge [address]       Complaint Diagnosis Description Type Department Provider    3/27/22 Chest Pain; Shortness of Breath Acute chest pain . .. ED to Hosp-Admission (Discharged) (ADMIT) Errol Mcgrath MD; Monica Art. .. Was this an external facility discharge? Yes, 3/30/22   Discharge Facility:  [facility])     Care Transitions Nurse ( CTN) attempted to contact patient via telephone call. There was no response. A voicemail message was left requesting a non-emergency return telephone call. CTN  contact information provided.

## 2022-04-01 ENCOUNTER — PATIENT OUTREACH (OUTPATIENT)
Dept: CASE MANAGEMENT | Age: 82
End: 2022-04-01

## 2022-04-01 NOTE — PROGRESS NOTES
Care Transitions Initial Call    Call within 2 business days of discharge: Yes     Patient: Esau Salinas Patient : 1940 MRN: [id number]    Last Discharge [address]       Complaint Diagnosis Description Type Department Provider    3/27/22 Chest Pain; Shortness of Breath Acute chest pain . .. ED to Hosp-Admission (Discharged) (ADMIT) Gonzalo Malagon MD; Rolo Art. .. Was this an external facility discharge? Yes, 3/30/22   Discharge Facility:  [facility])     Care Transitions Nurse ( CTN) attempted to contact patient via telephone call. There was no response. A voicemail message was left requesting a non-emergency return telephone call. CTN  contact information provided.

## 2022-04-07 ENCOUNTER — PATIENT OUTREACH (OUTPATIENT)
Dept: CASE MANAGEMENT | Age: 82
End: 2022-04-07

## 2022-04-07 NOTE — PROGRESS NOTES
Transitions of Care     Care Transitions Nurse ( CTN) unable to reach patient x2 for follow up. No PHI form noted to be on file. Episode resolved.

## 2022-05-11 ENCOUNTER — PATIENT OUTREACH (OUTPATIENT)
Dept: CASE MANAGEMENT | Age: 82
End: 2022-05-11

## 2022-05-11 NOTE — PROGRESS NOTES
.Complex Case Management Denial       Date/Time:  2022 2:46 PM    Method of communication with patient:phone    [facility] ( [facility]) contacted the patient by telephone to perform Ambulatory Care Coordination. Verified name and  with patient as identifiers. Provided introduction to self, and explanation of the Ambulatory Care Manager's role. Reviewed most recent clinic visit w/ patient who verbalized understanding. Patient given an opportunity to ask questions. The patient Declines Care Coordination Services at this time. Patient no longer has a REHABILITATION [facility] provider  The patient agrees to contact the PCP office or the [address] [place] [address] for questions related to their healthcare. [facility] provided contact information for future reference.

## 2022-09-15 PROBLEM — R55 SYNCOPE AND COLLAPSE: Status: ACTIVE | Noted: 2022-09-15

## 2022-09-16 PROBLEM — R55 SYNCOPE AND COLLAPSE: Status: RESOLVED | Noted: 2022-09-15 | Resolved: 2022-09-16

## 2022-09-16 PROBLEM — Z95.2 HISTORY OF TRANSCATHETER AORTIC VALVE REPLACEMENT (TAVR): Status: ACTIVE | Noted: 2022-09-16

## 2022-09-16 PROBLEM — S00.03XA SCALP HEMATOMA: Status: ACTIVE | Noted: 2022-09-16

## 2022-09-16 PROBLEM — E87.6 HYPOKALEMIA: Status: ACTIVE | Noted: 2022-09-16

## 2023-04-13 NOTE — PROGRESS NOTES
A user error has taken place: X-ray lumbar spine negative for any fracture or dislocation or any significant degenerative arthritis.  Notify pt  New recommendation

## 2023-05-15 RX ORDER — POTASSIUM CHLORIDE 1.5 G/1.77G
20 POWDER, FOR SOLUTION ORAL DAILY
COMMUNITY
Start: 2022-08-22

## 2023-05-16 ENCOUNTER — ANESTHESIA EVENT (OUTPATIENT)
Facility: HOSPITAL | Age: 83
End: 2023-05-16
Payer: MEDICARE

## 2023-05-17 ENCOUNTER — ANESTHESIA (OUTPATIENT)
Facility: HOSPITAL | Age: 83
End: 2023-05-17
Payer: MEDICARE

## 2023-05-17 ENCOUNTER — HOSPITAL ENCOUNTER (OUTPATIENT)
Facility: HOSPITAL | Age: 83
Setting detail: OUTPATIENT SURGERY
Discharge: HOME | End: 2023-05-17
Attending: OTOLARYNGOLOGY | Admitting: OTOLARYNGOLOGY
Payer: MEDICARE

## 2023-05-17 VITALS
TEMPERATURE: 98.1 F | WEIGHT: 131.8 LBS | RESPIRATION RATE: 18 BRPM | BODY MASS INDEX: 24.25 KG/M2 | DIASTOLIC BLOOD PRESSURE: 69 MMHG | SYSTOLIC BLOOD PRESSURE: 131 MMHG | HEIGHT: 62 IN | OXYGEN SATURATION: 95 % | HEART RATE: 88 BPM

## 2023-05-17 LAB
ANION GAP SERPL CALC-SCNC: 7 MMOL/L (ref 3–18)
BUN SERPL-MCNC: 10 MG/DL (ref 7–18)
BUN/CREAT SERPL: 13 (ref 12–20)
CALCIUM SERPL-MCNC: 9.1 MG/DL (ref 8.5–10.1)
CHLORIDE SERPL-SCNC: 113 MMOL/L (ref 100–111)
CO2 SERPL-SCNC: 22 MMOL/L (ref 21–32)
CREAT SERPL-MCNC: 0.8 MG/DL (ref 0.6–1.3)
GLUCOSE BLD STRIP.AUTO-MCNC: 109 MG/DL (ref 70–110)
GLUCOSE BLD STRIP.AUTO-MCNC: 163 MG/DL (ref 70–110)
GLUCOSE SERPL-MCNC: 100 MG/DL (ref 74–99)
POTASSIUM SERPL-SCNC: 3.5 MMOL/L (ref 3.5–5.5)
SODIUM SERPL-SCNC: 142 MMOL/L (ref 136–145)

## 2023-05-17 PROCEDURE — 2500000003 HC RX 250 WO HCPCS: Performed by: OTOLARYNGOLOGY

## 2023-05-17 PROCEDURE — 2580000003 HC RX 258: Performed by: NURSE ANESTHETIST, CERTIFIED REGISTERED

## 2023-05-17 PROCEDURE — 99100 ANES PT EXTEME AGE<1 YR&>70: CPT | Performed by: NURSE ANESTHETIST, CERTIFIED REGISTERED

## 2023-05-17 PROCEDURE — 6370000000 HC RX 637 (ALT 250 FOR IP): Performed by: NURSE ANESTHETIST, CERTIFIED REGISTERED

## 2023-05-17 PROCEDURE — 00160 ANES PX NOSE&SINUS NOS: CPT | Performed by: NURSE ANESTHETIST, CERTIFIED REGISTERED

## 2023-05-17 PROCEDURE — 3600000012 HC SURGERY LEVEL 2 ADDTL 15MIN: Performed by: OTOLARYNGOLOGY

## 2023-05-17 PROCEDURE — 36415 COLL VENOUS BLD VENIPUNCTURE: CPT

## 2023-05-17 PROCEDURE — 80048 BASIC METABOLIC PNL TOTAL CA: CPT

## 2023-05-17 PROCEDURE — 7100000001 HC PACU RECOVERY - ADDTL 15 MIN: Performed by: OTOLARYNGOLOGY

## 2023-05-17 PROCEDURE — 3600000002 HC SURGERY LEVEL 2 BASE: Performed by: OTOLARYNGOLOGY

## 2023-05-17 PROCEDURE — 6370000000 HC RX 637 (ALT 250 FOR IP): Performed by: OTOLARYNGOLOGY

## 2023-05-17 PROCEDURE — 3700000000 HC ANESTHESIA ATTENDED CARE: Performed by: OTOLARYNGOLOGY

## 2023-05-17 PROCEDURE — 3700000001 HC ADD 15 MINUTES (ANESTHESIA): Performed by: OTOLARYNGOLOGY

## 2023-05-17 PROCEDURE — 2500000003 HC RX 250 WO HCPCS: Performed by: NURSE ANESTHETIST, CERTIFIED REGISTERED

## 2023-05-17 PROCEDURE — 2709999900 HC NON-CHARGEABLE SUPPLY: Performed by: OTOLARYNGOLOGY

## 2023-05-17 PROCEDURE — 6360000002 HC RX W HCPCS: Performed by: NURSE ANESTHETIST, CERTIFIED REGISTERED

## 2023-05-17 PROCEDURE — 7100000011 HC PHASE II RECOVERY - ADDTL 15 MIN: Performed by: OTOLARYNGOLOGY

## 2023-05-17 PROCEDURE — 82962 GLUCOSE BLOOD TEST: CPT

## 2023-05-17 PROCEDURE — 7100000010 HC PHASE II RECOVERY - FIRST 15 MIN: Performed by: OTOLARYNGOLOGY

## 2023-05-17 PROCEDURE — 99100 ANES PT EXTEME AGE<1 YR&>70: CPT | Performed by: ANESTHESIOLOGY

## 2023-05-17 PROCEDURE — 7100000000 HC PACU RECOVERY - FIRST 15 MIN: Performed by: OTOLARYNGOLOGY

## 2023-05-17 PROCEDURE — 00160 ANES PX NOSE&SINUS NOS: CPT | Performed by: ANESTHESIOLOGY

## 2023-05-17 RX ORDER — FENTANYL CITRATE 50 UG/ML
50 INJECTION, SOLUTION INTRAMUSCULAR; INTRAVENOUS EVERY 5 MIN PRN
Status: DISCONTINUED | OUTPATIENT
Start: 2023-05-17 | End: 2023-05-17 | Stop reason: HOSPADM

## 2023-05-17 RX ORDER — ROCURONIUM BROMIDE 10 MG/ML
INJECTION, SOLUTION INTRAVENOUS PRN
Status: DISCONTINUED | OUTPATIENT
Start: 2023-05-17 | End: 2023-05-17 | Stop reason: SDUPTHER

## 2023-05-17 RX ORDER — ONDANSETRON 2 MG/ML
INJECTION INTRAMUSCULAR; INTRAVENOUS PRN
Status: DISCONTINUED | OUTPATIENT
Start: 2023-05-17 | End: 2023-05-17 | Stop reason: SDUPTHER

## 2023-05-17 RX ORDER — LIDOCAINE HYDROCHLORIDE AND EPINEPHRINE BITARTRATE 20; .01 MG/ML; MG/ML
INJECTION, SOLUTION SUBCUTANEOUS PRN
Status: DISCONTINUED | OUTPATIENT
Start: 2023-05-17 | End: 2023-05-17 | Stop reason: ALTCHOICE

## 2023-05-17 RX ORDER — INSULIN LISPRO 100 [IU]/ML
1-15 INJECTION, SOLUTION INTRAVENOUS; SUBCUTANEOUS ONCE
Status: COMPLETED | OUTPATIENT
Start: 2023-05-17 | End: 2023-05-17

## 2023-05-17 RX ORDER — FAMOTIDINE 20 MG/1
20 TABLET, FILM COATED ORAL ONCE
Status: COMPLETED | OUTPATIENT
Start: 2023-05-17 | End: 2023-05-17

## 2023-05-17 RX ORDER — DEXTROSE MONOHYDRATE 100 MG/ML
INJECTION, SOLUTION INTRAVENOUS CONTINUOUS PRN
Status: DISCONTINUED | OUTPATIENT
Start: 2023-05-17 | End: 2023-05-17 | Stop reason: HOSPADM

## 2023-05-17 RX ORDER — DIPHENHYDRAMINE HYDROCHLORIDE 50 MG/ML
12.5 INJECTION INTRAMUSCULAR; INTRAVENOUS
Status: DISCONTINUED | OUTPATIENT
Start: 2023-05-17 | End: 2023-05-17 | Stop reason: HOSPADM

## 2023-05-17 RX ORDER — PROPOFOL 10 MG/ML
INJECTION, EMULSION INTRAVENOUS PRN
Status: DISCONTINUED | OUTPATIENT
Start: 2023-05-17 | End: 2023-05-17 | Stop reason: SDUPTHER

## 2023-05-17 RX ORDER — SUCCINYLCHOLINE/SOD CL,ISO/PF 100 MG/5ML
SYRINGE (ML) INTRAVENOUS PRN
Status: DISCONTINUED | OUTPATIENT
Start: 2023-05-17 | End: 2023-05-17 | Stop reason: SDUPTHER

## 2023-05-17 RX ORDER — CEFAZOLIN SODIUM 1 G/3ML
INJECTION, POWDER, FOR SOLUTION INTRAMUSCULAR; INTRAVENOUS PRN
Status: DISCONTINUED | OUTPATIENT
Start: 2023-05-17 | End: 2023-05-17 | Stop reason: SDUPTHER

## 2023-05-17 RX ORDER — FENTANYL CITRATE 50 UG/ML
INJECTION, SOLUTION INTRAMUSCULAR; INTRAVENOUS PRN
Status: DISCONTINUED | OUTPATIENT
Start: 2023-05-17 | End: 2023-05-17 | Stop reason: SDUPTHER

## 2023-05-17 RX ORDER — MINERAL OIL AND WHITE PETROLATUM 150; 830 MG/G; MG/G
OINTMENT OPHTHALMIC PRN
Status: DISCONTINUED | OUTPATIENT
Start: 2023-05-17 | End: 2023-05-17

## 2023-05-17 RX ORDER — DEXAMETHASONE SODIUM PHOSPHATE 4 MG/ML
INJECTION, SOLUTION INTRA-ARTICULAR; INTRALESIONAL; INTRAMUSCULAR; INTRAVENOUS; SOFT TISSUE PRN
Status: DISCONTINUED | OUTPATIENT
Start: 2023-05-17 | End: 2023-05-17 | Stop reason: SDUPTHER

## 2023-05-17 RX ORDER — ONDANSETRON 2 MG/ML
4 INJECTION INTRAMUSCULAR; INTRAVENOUS
Status: DISCONTINUED | OUTPATIENT
Start: 2023-05-17 | End: 2023-05-17 | Stop reason: HOSPADM

## 2023-05-17 RX ORDER — GLUCAGON 1 MG/ML
1 KIT INJECTION PRN
Status: DISCONTINUED | OUTPATIENT
Start: 2023-05-17 | End: 2023-05-17 | Stop reason: HOSPADM

## 2023-05-17 RX ORDER — PHENYLEPHRINE HYDROCHLORIDE 10 MG/ML
INJECTION INTRAVENOUS PRN
Status: DISCONTINUED | OUTPATIENT
Start: 2023-05-17 | End: 2023-05-17 | Stop reason: SDUPTHER

## 2023-05-17 RX ORDER — SODIUM CHLORIDE, SODIUM LACTATE, POTASSIUM CHLORIDE, CALCIUM CHLORIDE 600; 310; 30; 20 MG/100ML; MG/100ML; MG/100ML; MG/100ML
INJECTION, SOLUTION INTRAVENOUS CONTINUOUS
Status: DISCONTINUED | OUTPATIENT
Start: 2023-05-17 | End: 2023-05-17 | Stop reason: HOSPADM

## 2023-05-17 RX ORDER — LIDOCAINE HYDROCHLORIDE 20 MG/ML
INJECTION, SOLUTION EPIDURAL; INFILTRATION; INTRACAUDAL; PERINEURAL PRN
Status: DISCONTINUED | OUTPATIENT
Start: 2023-05-17 | End: 2023-05-17 | Stop reason: SDUPTHER

## 2023-05-17 RX ORDER — EPHEDRINE SULFATE/0.9% NACL/PF 50 MG/5 ML
SYRINGE (ML) INTRAVENOUS PRN
Status: DISCONTINUED | OUTPATIENT
Start: 2023-05-17 | End: 2023-05-17 | Stop reason: SDUPTHER

## 2023-05-17 RX ADMIN — DEXAMETHASONE SODIUM PHOSPHATE 4 MG: 4 INJECTION, SOLUTION INTRAMUSCULAR; INTRAVENOUS at 10:53

## 2023-05-17 RX ADMIN — Medication 5 MG: at 11:42

## 2023-05-17 RX ADMIN — FENTANYL CITRATE 25 MCG: 50 INJECTION, SOLUTION INTRAMUSCULAR; INTRAVENOUS at 12:40

## 2023-05-17 RX ADMIN — PHENYLEPHRINE HYDROCHLORIDE 100 MCG: 10 INJECTION INTRAVENOUS at 12:28

## 2023-05-17 RX ADMIN — PHENYLEPHRINE HYDROCHLORIDE 100 MCG: 10 INJECTION INTRAVENOUS at 12:53

## 2023-05-17 RX ADMIN — ROCURONIUM BROMIDE 5 MG: 10 INJECTION, SOLUTION INTRAVENOUS at 10:37

## 2023-05-17 RX ADMIN — ONDANSETRON 4 MG: 2 INJECTION INTRAMUSCULAR; INTRAVENOUS at 13:11

## 2023-05-17 RX ADMIN — FAMOTIDINE 20 MG: 20 TABLET ORAL at 09:02

## 2023-05-17 RX ADMIN — DEXMEDETOMIDINE HYDROCHLORIDE 4 MCG: 100 INJECTION, SOLUTION INTRAVENOUS at 12:46

## 2023-05-17 RX ADMIN — PROPOFOL 50 MG: 10 INJECTION, EMULSION INTRAVENOUS at 10:45

## 2023-05-17 RX ADMIN — LIDOCAINE HYDROCHLORIDE 60 MG: 20 INJECTION, SOLUTION EPIDURAL; INFILTRATION; INTRACAUDAL; PERINEURAL at 10:37

## 2023-05-17 RX ADMIN — POLYVINYL ALCOHOL, POVIDONE 1 DROP: 14; 6 SOLUTION/ DROPS OPHTHALMIC at 16:27

## 2023-05-17 RX ADMIN — FENTANYL CITRATE 25 MCG: 50 INJECTION, SOLUTION INTRAMUSCULAR; INTRAVENOUS at 10:46

## 2023-05-17 RX ADMIN — DEXMEDETOMIDINE HYDROCHLORIDE 4 MCG: 100 INJECTION, SOLUTION INTRAVENOUS at 10:51

## 2023-05-17 RX ADMIN — SODIUM CHLORIDE, POTASSIUM CHLORIDE, SODIUM LACTATE AND CALCIUM CHLORIDE: 600; 310; 30; 20 INJECTION, SOLUTION INTRAVENOUS at 09:01

## 2023-05-17 RX ADMIN — PHENYLEPHRINE HYDROCHLORIDE 100 MCG: 10 INJECTION INTRAVENOUS at 11:17

## 2023-05-17 RX ADMIN — Medication 5 MG: at 11:48

## 2023-05-17 RX ADMIN — PROPOFOL 50 MG: 10 INJECTION, EMULSION INTRAVENOUS at 10:50

## 2023-05-17 RX ADMIN — Medication 100 MG: at 10:38

## 2023-05-17 RX ADMIN — PHENYLEPHRINE HYDROCHLORIDE 100 MCG: 10 INJECTION INTRAVENOUS at 11:34

## 2023-05-17 RX ADMIN — PHENYLEPHRINE HYDROCHLORIDE 100 MCG: 10 INJECTION INTRAVENOUS at 11:25

## 2023-05-17 RX ADMIN — PHENYLEPHRINE HYDROCHLORIDE 100 MCG: 10 INJECTION INTRAVENOUS at 13:25

## 2023-05-17 RX ADMIN — PROPOFOL 150 MG: 10 INJECTION, EMULSION INTRAVENOUS at 10:37

## 2023-05-17 RX ADMIN — PHENYLEPHRINE HYDROCHLORIDE 100 MCG: 10 INJECTION INTRAVENOUS at 13:02

## 2023-05-17 RX ADMIN — PHENYLEPHRINE HYDROCHLORIDE 100 MCG: 10 INJECTION INTRAVENOUS at 13:10

## 2023-05-17 RX ADMIN — Medication 3 UNITS: at 14:16

## 2023-05-17 RX ADMIN — FENTANYL CITRATE 25 MCG: 50 INJECTION, SOLUTION INTRAMUSCULAR; INTRAVENOUS at 12:45

## 2023-05-17 RX ADMIN — FENTANYL CITRATE 25 MCG: 50 INJECTION, SOLUTION INTRAMUSCULAR; INTRAVENOUS at 10:38

## 2023-05-17 RX ADMIN — PROPOFOL 50 MG: 10 INJECTION, EMULSION INTRAVENOUS at 12:45

## 2023-05-17 RX ADMIN — CEFAZOLIN 2 G: 330 INJECTION, POWDER, FOR SOLUTION INTRAMUSCULAR; INTRAVENOUS at 10:47

## 2023-05-17 ASSESSMENT — PAIN - FUNCTIONAL ASSESSMENT: PAIN_FUNCTIONAL_ASSESSMENT: 0-10

## 2023-05-17 ASSESSMENT — PAIN SCALES - GENERAL
PAINLEVEL_OUTOF10: 0

## 2023-05-17 NOTE — DISCHARGE INSTRUCTIONS
Elevate head of bed  No heavy lifting, straining or bending over  Apply ice or ice gauze to eyes as much as possible first 3 days, but no ice on flaps  Use OTC arnica gel on bruised skin 4 times per day  Clean suture lines with peroxide/water on gauze 3 times per day, then apply Bacitracin each time  Saline nasal spray 2 puffs each nostril 3 times per day, then apply Bacitracin gently inside each nostril  Expect some bleeding from nostrils first 3 days--may use drip bandage beneath nose  Okay to shower starting 5/18/23  Restart aspirin 5/19/23, all other regular medications restart today  Prescriptions faxed to pharmacy        DISCHARGE SUMMARY from Nurse    PATIENT INSTRUCTIONS:    After general anesthesia or intravenous sedation, for 24 hours or while taking prescription Narcotics:  Limit your activities  Do not drive and operate hazardous machinery  Do not make important personal or business decisions  Do  not drink alcoholic beverages  If you have not urinated within 8 hours after discharge, please contact your surgeon on call. Report the following to your surgeon:  Excessive pain, swelling, redness or odor of or around the surgical area  Temperature over 100.5  Nausea and vomiting lasting longer than 4 hours or if unable to take medications  Any signs of decreased circulation or nerve impairment to extremity: change in color, persistent  numbness, tingling, coldness or increase pain  Any questions        These are general instructions for a healthy lifestyle:    No smoking/ No tobacco products/ Avoid exposure to second hand smoke  Surgeon General's Warning:  Quitting smoking now greatly reduces serious risk to your health.     Obesity, smoking, and sedentary lifestyle greatly increases your risk for illness    A healthy diet, regular physical exercise & weight monitoring are important for maintaining a healthy lifestyle    You may be retaining fluid if you have a history of heart failure or if you experience

## 2023-05-17 NOTE — BRIEF OP NOTE
Brief Postoperative Note    Lincoln Sousa  YOB: 1940  [id number]    Pre-operative Diagnosis: Large Mohs defect left nasal ala (through-and-through), 2 right medial cheek Mohs defects    Post-operative Diagnosis: Same    Procedure: Reconstruction left ala defect with septal cartilage graft, intranasal mucosal graft, cheek rotation flap; reconstruction right cheek defects with cheek rotation flap    Anesthesia: General    Surgeons/Assistants: Ted Dalton    Estimated Blood Loss: less than 50     Complications: None    Specimens: Was Not Obtained    Findings: As above    Electronically signed by Marlin Mattson MD on 5/17/2023 at 1:49 PM

## 2023-05-17 NOTE — ANESTHESIA PRE PROCEDURE
Department of Anesthesiology  Preprocedure Note       Name:  Jacinta Jones   Age:  [de-identified] y.o.  :  1940                                          MRN:  [id number]         Date:  2023      Surgeon: Kenny So):  Senthil Bueno MD    Procedure: Procedure(s):  RECONSTRUCTION NASAL AND CHEEK DEFECT    Medications prior to admission:   Prior to Admission medications    Medication Sig Start Date End Date Taking? Authorizing Provider   potassium chloride (KLOR-CON) 20 MEQ packet Take 20 mEq by mouth daily 22  Yes Historical Provider, MD   aspirin 81 MG chewable tablet Take 1 tablet by mouth daily 22   Ar Automatic Reconciliation   hydroCHLOROthiazide (HYDRODIURIL) 12.5 MG tablet Take 12.5 mg by mouth daily  Patient not taking: Reported on 2023   Ar Automatic Reconciliation   lidocaine (LIDODERM) 5 % Apply patch to the affected area for 12 hours a day and remove for 12 hours a day.  21   Ar Automatic Reconciliation   losartan (COZAAR) 50 MG tablet Take 1 tablet by mouth daily  Patient not taking: Reported on 2023   Ar Automatic Reconciliation   spironolactone (ALDACTONE) 25 MG tablet Take 25 mg by mouth daily  Patient not taking: Reported on 2023 3/31/22   Ar Automatic Reconciliation       Current medications:    Current Facility-Administered Medications   Medication Dose Route Frequency Provider Last Rate Last Admin    lactated ringers IV soln infusion   IntraVENous Continuous Diego Jennifer, APRN - CRNA 125 mL/hr at 23 0901 New Bag at 23 0901    glucose chewable tablet 16 g  4 tablet Oral PRN Diego Jennifer, APRN - CRNA        dextrose bolus 10% 125 mL  125 mL IntraVENous PRN Diego Jennifer, APRN - CRNA        Or    dextrose bolus 10% 250 mL  250 mL IntraVENous PRN Diego Jennifer, APRN - CRNA        glucagon (rDNA) injection 1 mg  1 mg SubCUTAneous PRN Diego Jennifer, APRN - CRNA        dextrose 10 % infusion   IntraVENous Continuous PRN Elena

## 2023-05-17 NOTE — ANESTHESIA POSTPROCEDURE EVALUATION
Department of Anesthesiology  Postprocedure Note    Patient: Krystal Carmona  MRN: [id number]  YOB: 1940  Date of evaluation: 5/17/2023      Procedure Summary     Date: 05/17/23 Room / Location: [facility] MAIN 07 / [facility] MAIN OR    Anesthesia Start: 1032 Anesthesia Stop: 3788    Procedure: RECONSTRUCTION NASAL AND CHEEK DEFECT (Face) Diagnosis:       Basal cell carcinoma (BCC) of ala nasi      Basal cell carcinoma (BCC) of cheek      (Basal cell carcinoma (BCC) of ala nasi [C44.311])      (Basal cell carcinoma (BCC) of cheek [C44.319])    Surgeons: Gordon Adams MD Responsible Provider: Saba Arnold MD    Anesthesia Type: General ASA Status: 3          Anesthesia Type: General    Lloyd Phase I: Lloyd Score: 9    Lloyd Phase II: Lloyd Score: 10      Anesthesia Post Evaluation    Patient location during evaluation: PACU  Patient participation: complete - patient participated  Level of consciousness: awake and alert  Airway patency: patent  Nausea & Vomiting: no nausea and no vomiting  Complications: no  Cardiovascular status: hemodynamically stable  Respiratory status: acceptable  Hydration status: stable  Multimodal analgesia pain management approach

## 2023-05-17 NOTE — PROGRESS NOTES
Patient's right eye swollen and patient complaining that her eye wiley. Called Dr. Carmichael Handler office to make them aware. Per Dr. Adelso Baeza, patient may receive eye drops for comfort.

## 2023-05-18 NOTE — OP NOTE
[address] [place] [address]   OPERATIVE REPORT    Name:  Gerald Sosa  MR#:   [id number]  :  1940  ACCOUNT #:  [de-identified]  DATE OF SERVICE:  2023    BRIEF HISTORY:  The patient is an 24-year-old female with a history of sun exposure in the past, who was recently diagnosed with a large fungating basal cell carcinoma of the left nasal ala and adjacent alar-facial groove and upper cutaneous lip, as well as two basal cell carcinomas of the right inferomedial cheek. She underwent extensive Mohs excision per Dr. Derek Duff on 2023 - she was left with a 3.4 x 3.2 cm through-and-through defect of the left nasal ala and adjacent alar-facial groove and upper cutaneous lip; she was also left with a 2.4 x 1.4 cm defect of the right inferomedial cheek and a 1.4 x 1.1 cm defect just inferolateral to the first.  She presents now for extensive reconstructions for each of these three defects. PREOPERATIVE DIAGNOSIS:  Extensive nasal and facial defects status post Mohs excisions of basal cell carcinomas, as above. POSTOPERATIVE DIAGNOSIS:  Extensive nasal and facial defects status post Mohs excisions of basal cell carcinomas, as above. PROCEDURES PERFORMED:  1. Reconstruction of through-and-through left ala/alar-facial groove/upper cutaneous lip defect with septal cartilage graft (CPT 59646), intranasal septal mucoperichondrial flap for lining, and large inferiorly based cheek rotation flap. 2.  Reconstruction of combined right inferomedial cheek defects with inferiorly-based rotation flap - total area of repair for both large flaps is 103.57 cm2, CPT 41815 plus 25970 plus 80045). SURGEON:  Omid Tillman MD    ASSISTANT:  ***    ANESTHESIA:  General endotracheal.    COMPLICATIONS:  ***    SPECIMENS REMOVED:  ***    IMPLANTS:  ***    ESTIMATED BLOOD LOSS:  ***    PROCEDURE:  The patient was placed on the operating table in the supine position and was given Ancef 2 g IV.   She was orally intubated with a

## 2023-09-12 RX ORDER — LOSARTAN POTASSIUM 100 MG/1
100 TABLET ORAL DAILY
COMMUNITY

## 2023-09-12 NOTE — PERIOP NOTE
Instructions for your surgery at [address] [place] [address] Date:  9/12/2023      Patient's Name:  Moira Ramos           Surgery Date:  09/19/2023              Please enter the main entrance of the hospital and check-in at the  located in the [facility]. Once checked in at the , you will take the elevators to the second floor, and report to the waiting room on the left. The room will say Procedure Registration. Do NOT eat or drink anything, including candy, gum, or ice chips after midnight prior to your surgery, unless you have specific instructions from your surgeon or anesthesia provider to do so. Brush your teeth before coming to the hospital. You may swish with water, but do not swallow. No smoking/Vaping/E-Cigarettes 24 hours prior to the day of surgery. No alcohol 24 hours prior to the day of surgery. No recreational drugs for one week prior to the day of surgery. Bring Photo ID, Insurance information, and Co-pay if required on day of surgery. Bring in pertinent legal documents, such as, Medical Power of ESTEFANIA MUNIZ, DNR, Advance Directive, etc.  Leave all valuables, including money/purse, at home. Remove all jewelry, including ALL body piercings, nail polish, acrylic nails, and makeup (including mascara); no lotions, powders, deodorant, or perfume/cologne/after shave on the skin. Follow instruction for Hibiclens washes and CHG wipes from surgeon's office. Glasses and dentures may be worn to the hospital. They must be removed prior to surgery. Please bring case/container for glasses or dentures. Contact lenses should not be worn on day of surgery. Call your doctor's office if symptoms of a cold or illness develop within 24-48 hours prior to your surgery. Call your doctor's office if you have any questions concerning insurance or co-pays. 15. AN ADULT (relative or friend 25 years or older) [address]   16. Please make arrangements

## 2023-09-18 ENCOUNTER — ANESTHESIA EVENT (OUTPATIENT)
Facility: HOSPITAL | Age: 83
End: 2023-09-18
Payer: MEDICARE

## 2023-09-19 ENCOUNTER — HOSPITAL ENCOUNTER (OUTPATIENT)
Facility: HOSPITAL | Age: 83
Setting detail: OUTPATIENT SURGERY
Discharge: HOME | End: 2023-09-19
Attending: OTOLARYNGOLOGY | Admitting: OTOLARYNGOLOGY
Payer: MEDICARE

## 2023-09-19 ENCOUNTER — ANESTHESIA (OUTPATIENT)
Facility: HOSPITAL | Age: 83
End: 2023-09-19
Payer: MEDICARE

## 2023-09-19 VITALS
RESPIRATION RATE: 17 BRPM | TEMPERATURE: 98.2 F | HEART RATE: 67 BPM | BODY MASS INDEX: 25.86 KG/M2 | OXYGEN SATURATION: 98 % | SYSTOLIC BLOOD PRESSURE: 129 MMHG | DIASTOLIC BLOOD PRESSURE: 65 MMHG | WEIGHT: 137 LBS | HEIGHT: 61 IN

## 2023-09-19 LAB
GLUCOSE BLD STRIP.AUTO-MCNC: 131 MG/DL (ref 70–110)
GLUCOSE BLD STRIP.AUTO-MCNC: 201 MG/DL (ref 70–110)
GLUCOSE BLD STRIP.AUTO-MCNC: 54 MG/DL (ref 70–110)
GLUCOSE BLD STRIP.AUTO-MCNC: 69 MG/DL (ref 70–110)

## 2023-09-19 PROCEDURE — 7100000000 HC PACU RECOVERY - FIRST 15 MIN: Performed by: OTOLARYNGOLOGY

## 2023-09-19 PROCEDURE — 2580000003 HC RX 258: Performed by: OTOLARYNGOLOGY

## 2023-09-19 PROCEDURE — 6370000000 HC RX 637 (ALT 250 FOR IP): Performed by: OTOLARYNGOLOGY

## 2023-09-19 PROCEDURE — 3700000000 HC ANESTHESIA ATTENDED CARE: Performed by: OTOLARYNGOLOGY

## 2023-09-19 PROCEDURE — 3600000012 HC SURGERY LEVEL 2 ADDTL 15MIN: Performed by: OTOLARYNGOLOGY

## 2023-09-19 PROCEDURE — 82962 GLUCOSE BLOOD TEST: CPT

## 2023-09-19 PROCEDURE — 7100000011 HC PHASE II RECOVERY - ADDTL 15 MIN: Performed by: OTOLARYNGOLOGY

## 2023-09-19 PROCEDURE — 7100000001 HC PACU RECOVERY - ADDTL 15 MIN: Performed by: OTOLARYNGOLOGY

## 2023-09-19 PROCEDURE — 6370000000 HC RX 637 (ALT 250 FOR IP): Performed by: NURSE ANESTHETIST, CERTIFIED REGISTERED

## 2023-09-19 PROCEDURE — 2580000003 HC RX 258: Performed by: NURSE ANESTHETIST, CERTIFIED REGISTERED

## 2023-09-19 PROCEDURE — 2500000003 HC RX 250 WO HCPCS: Performed by: NURSE ANESTHETIST, CERTIFIED REGISTERED

## 2023-09-19 PROCEDURE — 7100000010 HC PHASE II RECOVERY - FIRST 15 MIN: Performed by: OTOLARYNGOLOGY

## 2023-09-19 PROCEDURE — 3700000001 HC ADD 15 MINUTES (ANESTHESIA): Performed by: OTOLARYNGOLOGY

## 2023-09-19 PROCEDURE — 6360000002 HC RX W HCPCS: Performed by: OTOLARYNGOLOGY

## 2023-09-19 PROCEDURE — C1765 ADHESION BARRIER: HCPCS | Performed by: OTOLARYNGOLOGY

## 2023-09-19 PROCEDURE — 3600000002 HC SURGERY LEVEL 2 BASE: Performed by: OTOLARYNGOLOGY

## 2023-09-19 PROCEDURE — 2709999900 HC NON-CHARGEABLE SUPPLY: Performed by: OTOLARYNGOLOGY

## 2023-09-19 PROCEDURE — 2500000003 HC RX 250 WO HCPCS: Performed by: OTOLARYNGOLOGY

## 2023-09-19 PROCEDURE — 6360000002 HC RX W HCPCS: Performed by: NURSE ANESTHETIST, CERTIFIED REGISTERED

## 2023-09-19 RX ORDER — LIDOCAINE HYDROCHLORIDE 20 MG/ML
INJECTION, SOLUTION EPIDURAL; INFILTRATION; INTRACAUDAL; PERINEURAL PRN
Status: DISCONTINUED | OUTPATIENT
Start: 2023-09-19 | End: 2023-09-19 | Stop reason: SDUPTHER

## 2023-09-19 RX ORDER — PROPOFOL 10 MG/ML
INJECTION, EMULSION INTRAVENOUS PRN
Status: DISCONTINUED | OUTPATIENT
Start: 2023-09-19 | End: 2023-09-19 | Stop reason: SDUPTHER

## 2023-09-19 RX ORDER — SODIUM CHLORIDE 0.9 % (FLUSH) 0.9 %
5-40 SYRINGE (ML) INJECTION PRN
Status: DISCONTINUED | OUTPATIENT
Start: 2023-09-19 | End: 2023-09-19 | Stop reason: HOSPADM

## 2023-09-19 RX ORDER — EPHEDRINE SULFATE/0.9% NACL/PF 50 MG/5 ML
SYRINGE (ML) INTRAVENOUS PRN
Status: DISCONTINUED | OUTPATIENT
Start: 2023-09-19 | End: 2023-09-19 | Stop reason: SDUPTHER

## 2023-09-19 RX ORDER — COCAINE HYDROCHLORIDE 40 MG/ML
40 SOLUTION NASAL ONCE
Status: DISCONTINUED | OUTPATIENT
Start: 2023-09-19 | End: 2023-09-19 | Stop reason: HOSPADM

## 2023-09-19 RX ORDER — OXYMETAZOLINE HYDROCHLORIDE 0.05 G/100ML
2 SPRAY NASAL 2 TIMES DAILY
Status: DISCONTINUED | OUTPATIENT
Start: 2023-09-19 | End: 2023-09-19 | Stop reason: HOSPADM

## 2023-09-19 RX ORDER — FUROSEMIDE 20 MG/1
20 TABLET ORAL DAILY
COMMUNITY

## 2023-09-19 RX ORDER — LIDOCAINE HYDROCHLORIDE AND EPINEPHRINE BITARTRATE 20; .01 MG/ML; MG/ML
INJECTION, SOLUTION SUBCUTANEOUS PRN
Status: DISCONTINUED | OUTPATIENT
Start: 2023-09-19 | End: 2023-09-19 | Stop reason: ALTCHOICE

## 2023-09-19 RX ORDER — SODIUM CHLORIDE 0.9 % (FLUSH) 0.9 %
5-40 SYRINGE (ML) INJECTION EVERY 12 HOURS SCHEDULED
Status: DISCONTINUED | OUTPATIENT
Start: 2023-09-19 | End: 2023-09-19 | Stop reason: HOSPADM

## 2023-09-19 RX ORDER — LABETALOL HYDROCHLORIDE 5 MG/ML
10 INJECTION, SOLUTION INTRAVENOUS
Status: DISCONTINUED | OUTPATIENT
Start: 2023-09-19 | End: 2023-09-19 | Stop reason: HOSPADM

## 2023-09-19 RX ORDER — OXYCODONE HYDROCHLORIDE 5 MG/1
5 TABLET ORAL
Status: DISCONTINUED | OUTPATIENT
Start: 2023-09-19 | End: 2023-09-19 | Stop reason: HOSPADM

## 2023-09-19 RX ORDER — ONDANSETRON 2 MG/ML
4 INJECTION INTRAMUSCULAR; INTRAVENOUS
Status: DISCONTINUED | OUTPATIENT
Start: 2023-09-19 | End: 2023-09-19 | Stop reason: HOSPADM

## 2023-09-19 RX ORDER — SODIUM CHLORIDE 9 MG/ML
INJECTION, SOLUTION INTRAVENOUS PRN
Status: DISCONTINUED | OUTPATIENT
Start: 2023-09-19 | End: 2023-09-19 | Stop reason: HOSPADM

## 2023-09-19 RX ORDER — FENTANYL CITRATE 50 UG/ML
INJECTION, SOLUTION INTRAMUSCULAR; INTRAVENOUS PRN
Status: DISCONTINUED | OUTPATIENT
Start: 2023-09-19 | End: 2023-09-19 | Stop reason: SDUPTHER

## 2023-09-19 RX ORDER — DEXTROSE MONOHYDRATE 100 MG/ML
INJECTION, SOLUTION INTRAVENOUS CONTINUOUS PRN
Status: DISCONTINUED | OUTPATIENT
Start: 2023-09-19 | End: 2023-09-19 | Stop reason: HOSPADM

## 2023-09-19 RX ORDER — OXYMETAZOLINE HYDROCHLORIDE 0.05 G/100ML
SPRAY NASAL PRN
Status: DISCONTINUED | OUTPATIENT
Start: 2023-09-19 | End: 2023-09-19 | Stop reason: ALTCHOICE

## 2023-09-19 RX ORDER — HYDRALAZINE HYDROCHLORIDE 20 MG/ML
10 INJECTION INTRAMUSCULAR; INTRAVENOUS
Status: DISCONTINUED | OUTPATIENT
Start: 2023-09-19 | End: 2023-09-19 | Stop reason: HOSPADM

## 2023-09-19 RX ORDER — ONDANSETRON 2 MG/ML
INJECTION INTRAMUSCULAR; INTRAVENOUS PRN
Status: DISCONTINUED | OUTPATIENT
Start: 2023-09-19 | End: 2023-09-19 | Stop reason: SDUPTHER

## 2023-09-19 RX ORDER — DEXAMETHASONE SODIUM PHOSPHATE 4 MG/ML
INJECTION, SOLUTION INTRA-ARTICULAR; INTRALESIONAL; INTRAMUSCULAR; INTRAVENOUS; SOFT TISSUE PRN
Status: DISCONTINUED | OUTPATIENT
Start: 2023-09-19 | End: 2023-09-19 | Stop reason: SDUPTHER

## 2023-09-19 RX ORDER — ACETAMINOPHEN AND CODEINE PHOSPHATE 300; 30 MG/1; MG/1
1 TABLET ORAL ONCE
Status: COMPLETED | OUTPATIENT
Start: 2023-09-19 | End: 2023-09-19

## 2023-09-19 RX ORDER — LIDOCAINE HYDROCHLORIDE 10 MG/ML
1 INJECTION, SOLUTION EPIDURAL; INFILTRATION; INTRACAUDAL; PERINEURAL
Status: DISCONTINUED | OUTPATIENT
Start: 2023-09-19 | End: 2023-09-19 | Stop reason: HOSPADM

## 2023-09-19 RX ORDER — SODIUM CHLORIDE, SODIUM LACTATE, POTASSIUM CHLORIDE, CALCIUM CHLORIDE 600; 310; 30; 20 MG/100ML; MG/100ML; MG/100ML; MG/100ML
INJECTION, SOLUTION INTRAVENOUS CONTINUOUS
Status: DISCONTINUED | OUTPATIENT
Start: 2023-09-19 | End: 2023-09-19 | Stop reason: HOSPADM

## 2023-09-19 RX ORDER — PROCHLORPERAZINE EDISYLATE 5 MG/ML
5 INJECTION INTRAMUSCULAR; INTRAVENOUS
Status: DISCONTINUED | OUTPATIENT
Start: 2023-09-19 | End: 2023-09-19 | Stop reason: HOSPADM

## 2023-09-19 RX ORDER — ACETAMINOPHEN 325 MG/1
650 TABLET ORAL
Status: DISCONTINUED | OUTPATIENT
Start: 2023-09-19 | End: 2023-09-19 | Stop reason: HOSPADM

## 2023-09-19 RX ORDER — FAMOTIDINE 20 MG/1
20 TABLET, FILM COATED ORAL ONCE
Status: COMPLETED | OUTPATIENT
Start: 2023-09-19 | End: 2023-09-19

## 2023-09-19 RX ORDER — FENTANYL CITRATE 50 UG/ML
25 INJECTION, SOLUTION INTRAMUSCULAR; INTRAVENOUS EVERY 5 MIN PRN
Status: DISCONTINUED | OUTPATIENT
Start: 2023-09-19 | End: 2023-09-19 | Stop reason: HOSPADM

## 2023-09-19 RX ORDER — INSULIN LISPRO 100 [IU]/ML
0-15 INJECTION, SOLUTION INTRAVENOUS; SUBCUTANEOUS ONCE
Status: DISCONTINUED | OUTPATIENT
Start: 2023-09-19 | End: 2023-09-19 | Stop reason: HOSPADM

## 2023-09-19 RX ORDER — KETOROLAC TROMETHAMINE 15 MG/ML
15 INJECTION, SOLUTION INTRAMUSCULAR; INTRAVENOUS
Status: DISCONTINUED | OUTPATIENT
Start: 2023-09-19 | End: 2023-09-19 | Stop reason: HOSPADM

## 2023-09-19 RX ADMIN — FENTANYL CITRATE 50 MCG: 50 INJECTION INTRAMUSCULAR; INTRAVENOUS at 10:37

## 2023-09-19 RX ADMIN — FAMOTIDINE 20 MG: 20 TABLET ORAL at 08:35

## 2023-09-19 RX ADMIN — DEXAMETHASONE SODIUM PHOSPHATE 4 MG: 4 INJECTION, SOLUTION INTRAMUSCULAR; INTRAVENOUS at 09:59

## 2023-09-19 RX ADMIN — Medication 10 MG: at 10:11

## 2023-09-19 RX ADMIN — Medication 10 MG: at 10:23

## 2023-09-19 RX ADMIN — SODIUM CHLORIDE, POTASSIUM CHLORIDE, SODIUM LACTATE AND CALCIUM CHLORIDE: 600; 310; 30; 20 INJECTION, SOLUTION INTRAVENOUS at 08:46

## 2023-09-19 RX ADMIN — DEXTROSE MONOHYDRATE 125 ML: 100 INJECTION, SOLUTION INTRAVENOUS at 08:43

## 2023-09-19 RX ADMIN — FENTANYL CITRATE 50 MCG: 50 INJECTION INTRAMUSCULAR; INTRAVENOUS at 09:56

## 2023-09-19 RX ADMIN — ONDANSETRON 4 MG: 2 INJECTION INTRAMUSCULAR; INTRAVENOUS at 10:26

## 2023-09-19 RX ADMIN — LIDOCAINE HYDROCHLORIDE 60 MG: 20 INJECTION, SOLUTION EPIDURAL; INFILTRATION; INTRACAUDAL; PERINEURAL at 09:50

## 2023-09-19 RX ADMIN — PROPOFOL 100 MG: 10 INJECTION, EMULSION INTRAVENOUS at 09:50

## 2023-09-19 RX ADMIN — WATER 2000 MG: 1 INJECTION, SOLUTION INTRAMUSCULAR; INTRAVENOUS; SUBCUTANEOUS at 09:59

## 2023-09-19 RX ADMIN — ACETAMINOPHEN AND CODEINE PHOSPHATE 1 TABLET: 300; 30 TABLET ORAL at 12:46

## 2023-09-19 ASSESSMENT — PAIN DESCRIPTION - DESCRIPTORS: DESCRIPTORS: ACHING

## 2023-09-19 ASSESSMENT — PAIN SCALES - GENERAL: PAINLEVEL_OUTOF10: 5

## 2023-09-19 ASSESSMENT — PAIN DESCRIPTION - LOCATION: LOCATION: FACE

## 2023-09-19 ASSESSMENT — PAIN - FUNCTIONAL ASSESSMENT
PAIN_FUNCTIONAL_ASSESSMENT: ACTIVITIES ARE NOT PREVENTED
PAIN_FUNCTIONAL_ASSESSMENT: 0-10

## 2023-09-19 ASSESSMENT — PAIN DESCRIPTION - ORIENTATION: ORIENTATION: LEFT

## 2023-09-19 ASSESSMENT — ENCOUNTER SYMPTOMS: SHORTNESS OF BREATH: 1

## 2023-09-19 NOTE — OP NOTE
[address]  OPERATIVE REPORT    Name:  Dave Venegas  MR#:   [id number]  :  1940  ACCOUNT #:  [de-identified]  DATE OF SERVICE:  2023    BRIEF HISTORY:  The patient is an 68-year-old female with a long history of sun exposure in the past, who was recently diagnosed with basal cell carcinomas of the left medial cheek, left nasal ala, and right cheek. She underwent extensive Mohs excisions per Dr. Mac Jackson on 2023 - she was left with a 3.4 x 3.2 cm through-and-through defect in the left nasal ala and alar-facial groove and a large right cheek defect as well. She was taken to the operating room on 2023 for reconstruction of the left ala/alar-facial groove defect with a septal cartilage graft, septal mucoperichondrial flap, and inferiorly based cheek rotation flap; the right cheek defect was reconstructed with an inferiorly-based rotation flap. Her flaps have healed nicely overall; she presents now for nasal endoscopy with release of the pedicle of her left intranasal flap. She also has fullness of the inferior portion of the left cheek flap - this will be revised with defatting and complex plastic repair. These procedures have been discussed at length with her and her son - they understand and wished to proceed. PREOPERATIVE DIAGNOSIS:  Status post multiple flap and graft reconstruction of through-and-through left nasal ala/alar-facial groove defect status post extensive Mohs excisions of basal cell carcinomas, as above. POSTOPERATIVE DIAGNOSIS:  Status post multiple flap and graft reconstruction of through-and-through left nasal ala/alar-facial groove defect status post extensive Mohs excisions of basal cell carcinomas, as above. PROCEDURES PERFORMED:  1. Revision left cheek flap with 2.6 cm complex plastic repair (CPT 02132). 2.  Nasal endoscopy with excision of left intranasal flap pedicle.     SURGEON:  Dulce Munoz MD    ASSISTANT:  ***    ANESTHESIA:  General

## 2023-09-19 NOTE — ANESTHESIA POSTPROCEDURE EVALUATION
Department of Anesthesiology  Postprocedure Note    Patient: Lilly Maldonado  MRN: [id number]  YOB: 1940  Date of evaluation: 9/19/2023      Procedure Summary     Date: 09/19/23 Room / Location: [facility] MAIN 07 / [facility] MAIN OR    Anesthesia Start: 0944 Anesthesia Stop:     Procedure: NASAL ENDOSCOPY SURGICAL, REVISION OF LEFT CHEEK FLAP (Nose) Diagnosis:       Basal cell carcinoma of nose      Basal cell carcinoma (BCC) of skin of other part of face      (Basal cell carcinoma of nose [C44.311])      (Basal cell carcinoma (BCC) of skin of other part of face [C44.319])    Surgeons: Twanna Scheuermann, MD Responsible Provider: Julissa Leo MD    Anesthesia Type: General ASA Status: 3          Anesthesia Type: General    Lloyd Phase I: Lloyd Score: 10    Lloyd Phase II:        Anesthesia Post Evaluation    Patient location during evaluation: bedside  Patient participation: complete - patient participated  Level of consciousness: awake and alert  Pain score: 0  Airway patency: patent  Nausea & Vomiting: no nausea and no vomiting  Complications: no  Cardiovascular status: hemodynamically stable  Respiratory status: acceptable, spontaneous ventilation and room air  Hydration status: stable  Multimodal analgesia pain management approach  Pain management: adequate

## 2023-09-19 NOTE — ANESTHESIA POSTPROCEDURE EVALUATION
Department of Anesthesiology  Postprocedure Note    Patient: Davonte Hampton  MRN: [id number]  YOB: 1940  Date of evaluation: 9/19/2023      Procedure Summary     Date: 09/19/23 Room / Location: [facility] MAIN 07 / [facility] MAIN OR    Anesthesia Start: 9178 Anesthesia Stop: 1109    Procedure: NASAL ENDOSCOPY SURGICAL, REVISION OF LEFT CHEEK FLAP (Nose) Diagnosis:       Basal cell carcinoma of nose      Basal cell carcinoma (BCC) of skin of other part of face      (Basal cell carcinoma of nose [C44.311])      (Basal cell carcinoma (BCC) of skin of other part of face [C44.319])    Surgeons: Franklin Dominguez MD Responsible Provider: Ceferino Woods MD    Anesthesia Type: General ASA Status: 3          Anesthesia Type: General    Lloyd Phase I: Lloyd Score: 10    Lloyd Phase II: Lloyd Score: 10      Anesthesia Post Evaluation    Patient location during evaluation: bedside  Airway patency: patent  Complications: no  Cardiovascular status: hemodynamically stable  Respiratory status: acceptable  Hydration status: stable  Pain management: adequate

## 2023-09-19 NOTE — PROGRESS NOTES
Plan--release of left intranasal flap, thinning left cheek flap  Surgery discussed. Risks include (but are not limited to) bleeding, infection, damage to adjacent structures, scarring, persistent left nasal dyspnea, possible need for additional procedures. Reviewed post-op care. Pt understands, wishes to proceed. History of Present Illness  Pt is s/p reconstruction of large through-and-through defect left nasal ala and alar-facial groove with septal cartilage graft, septal mucoperichondrial flap and inferiorly-based cheek rotation flap and reconstruction of right cheek defects with inferiorly-based rotation flap 5/17/23.  Review of Systems  None recorded  Screening  None recorded  Physical Exam  Face-bilateral cheek flaps intact and healing nicely; moderate fullness left cheek flap  Nasal cavities--mucosal scar band (from intranasal flap) traversing left anterior nasal cavity; septum intact    Eyelids--bilateral lids in good position, no ectropion  Lungs--clear  Cor--RRR with II/VI systolic murmur

## 2023-09-19 NOTE — PERIOP NOTE
Patient blood sugar was 69. Treated with dextrose 10. Recheck of blood sugar 201.  No other coverage needed per Dr Lawrence Colon

## 2023-09-19 NOTE — BRIEF OP NOTE
Brief Postoperative Note    Rosa M Telles  YOB: 1940  [id number]    Pre-operative Diagnosis: S/p flap/graft reconstruction through-and-through left nasal tip Mohs defect    Post-operative Diagnosis: Same    Procedure: Nasal endoscopy with excision left intranasal flap pedicle, revision left cheek flap with complex plastic repair    Anesthesia: General per LMA    Surgeons/Assistants: Ileana Montoya MD    Estimated Blood Loss: less than 50     Complications: None    Specimens: Was Not Obtained    Findings: Fibrous intranasal flap pedicle, fullness left cheek flap    Electronically signed by Keri Abad MD on 9/19/2023 at 9:39 AM

## (undated) DEVICE — NEEDLE COUNTER: Brand: DEROYAL

## (undated) DEVICE — TRAY PREP DRY W/ PREM GLV 2 APPL 6 SPNG 2 UNDPD 1 OVERWRAP

## (undated) DEVICE — STRAP,POSITIONING,KNEE/BODY,FOAM,4X60": Brand: MEDLINE

## (undated) DEVICE — DRESSING 470530 SINUSSTENT 20PK PR KNNDY: Brand: MEROCEL®

## (undated) DEVICE — GAUZE,SPONGE,8"X4",12PLY,XRAY,STRL,LF: Brand: MEDLINE

## (undated) DEVICE — GLOVE ORANGE PI 7 1/2   MSG9075

## (undated) DEVICE — SUTURE PERMA-HAND SZ 2-0 L30IN NONABSORBABLE BLK L26MM SH K833H

## (undated) DEVICE — DRAPE,REIN 53X77,STERILE: Brand: MEDLINE

## (undated) DEVICE — SHEET, DRAPE, SPLIT, STERILE: Brand: MEDLINE

## (undated) DEVICE — SUTURE VCRL SZ 4-0 L18IN ABSRB VLT TF L13MM 1/2 CIR J743D

## (undated) DEVICE — SUTURE ABSORBABLE BRAIDED 4-0 P-3 18 IN UD VICRYL J494G

## (undated) DEVICE — CORD ES L12FT BPLR FRCP

## (undated) DEVICE — SURGICAL PROCEDURE PACK TISS 3X5 IN ABSORBABLE SEPRAFILM

## (undated) DEVICE — SYRINGE MED 10ML TRNSLUC BRL PLUNG BLK MRK POLYPR CTRL

## (undated) DEVICE — INTENDED FOR TISSUE SEPARATION, AND OTHER PROCEDURES THAT REQUIRE A SHARP SURGICAL BLADE TO PUNCTURE OR CUT.: Brand: BARD-PARKER ® CARBON RIB-BACK BLADES

## (undated) DEVICE — NEEDLE HYPO 25GA L1.5IN BLU POLYPR HUB S STL REG BVL STR

## (undated) DEVICE — SUTURE ABSORBABLE MONOFILAMENT 4-0 SC-1 18 IN PLN GUT 1824H

## (undated) DEVICE — SPONGE,NEURO,0.5"X3",XR,STRL,LF,10/PK: Brand: MEDLINE

## (undated) DEVICE — TOWEL,OR,DSP,ST,BLUE,STD,4/PK,20PK/CS: Brand: MEDLINE

## (undated) DEVICE — LINER,SEMI-RIGID,3000CC,50EA/CS: Brand: MEDLINE

## (undated) DEVICE — TUBING, SUCTION, 1/4" X 12', STRAIGHT: Brand: MEDLINE

## (undated) DEVICE — GLOVE SURG SZ 7 CRM LTX FREE POLYISOPRENE POLYMER BEAD ANTI

## (undated) DEVICE — Z DISCONTINUED USE 2131664 WIPE INSTR W3XL3IN NONLINTING

## (undated) DEVICE — 4-PORT MANIFOLD: Brand: NEPTUNE 2

## (undated) DEVICE — ENT PACK: Brand: MEDLINE INDUSTRIES, INC.

## (undated) DEVICE — SUTURE PROL SZ 6-0 L18IN NONABSORBABLE BLU P-3 L13MM 3/8 8695G

## (undated) DEVICE — GARMENT,MEDLINE,DVT,INT,CALF,MED, GEN2: Brand: MEDLINE

## (undated) DEVICE — ELECTRODE PT RET AD L9FT HI MOIST COND ADH HYDRGEL CORDED

## (undated) DEVICE — PACKING 470404 MEROCEL 2000 10PK 8CM: Brand: MEROCEL®

## (undated) DEVICE — PACKING 8004007 NEURAY 200PK 13X76MM: Brand: NEURAY ®

## (undated) DEVICE — SUTURE PERMAHAND SZ 3-0 L30IN NONABSORBABLE BLK SH L26MM K832H

## (undated) DEVICE — PACK PROCEDURE SURG MAJ W/ BASIN LF

## (undated) DEVICE — GOWN,AURORA,NONRNF,XL,30/CS: Brand: MEDLINE